# Patient Record
Sex: FEMALE | Race: WHITE | NOT HISPANIC OR LATINO | Employment: OTHER | URBAN - METROPOLITAN AREA
[De-identification: names, ages, dates, MRNs, and addresses within clinical notes are randomized per-mention and may not be internally consistent; named-entity substitution may affect disease eponyms.]

---

## 2017-07-10 ENCOUNTER — HISTORICAL (OUTPATIENT)
Dept: LAB | Facility: HOSPITAL | Age: 67
End: 2017-07-10

## 2018-03-22 ENCOUNTER — HISTORICAL (OUTPATIENT)
Dept: RADIOLOGY | Facility: HOSPITAL | Age: 68
End: 2018-03-22

## 2019-01-17 ENCOUNTER — HISTORICAL (OUTPATIENT)
Dept: LAB | Facility: HOSPITAL | Age: 69
End: 2019-01-17

## 2019-01-17 LAB
ALBUMIN SERPL-MCNC: 4.3 GM/DL (ref 3.4–5)
ALP SERPL-CCNC: 53 UNIT/L (ref 46–116)
ALT SERPL-CCNC: 32 UNIT/L (ref 12–78)
AST SERPL-CCNC: 19 UNIT/L (ref 15–37)
BILIRUB SERPL-MCNC: 0.6 MG/DL (ref 0.2–1)
BILIRUBIN DIRECT+TOT PNL SERPL-MCNC: 0.14 MG/DL (ref 0–0.2)
BILIRUBIN DIRECT+TOT PNL SERPL-MCNC: 0.46 MG/DL (ref 0–0.8)
BUN SERPL-MCNC: 11.9 MG/DL (ref 7–18)
CALCIUM SERPL-MCNC: 10.8 MG/DL (ref 8.5–10.1)
CHLORIDE SERPL-SCNC: 97 MMOL/L (ref 98–107)
CHOLEST SERPL-MCNC: 281 MG/DL (ref 0–200)
CHOLEST/HDLC SERPL: 4.3 {RATIO} (ref 0–4)
CO2 SERPL-SCNC: 30.1 MMOL/L (ref 21–32)
CREAT SERPL-MCNC: 0.65 MG/DL (ref 0.6–1.3)
CREAT/UREA NIT SERPL: 18
DEPRECATED CALCIDIOL+CALCIFEROL SERPL-MC: 53.59 NG/ML (ref 30–80)
ERYTHROCYTE [DISTWIDTH] IN BLOOD BY AUTOMATED COUNT: 12 % (ref 11.5–17)
EST. AVERAGE GLUCOSE BLD GHB EST-MCNC: 105 MG/DL
GLUCOSE SERPL-MCNC: 99 MG/DL (ref 74–106)
HBA1C MFR BLD: 5.3 % (ref 4.5–6.2)
HCT VFR BLD AUTO: 46.7 % (ref 37–47)
HDLC SERPL-MCNC: 65 MG/DL (ref 40–60)
HGB BLD-MCNC: 15.7 GM/DL (ref 12–16)
LDLC SERPL CALC-MCNC: 194 MG/DL (ref 0–129)
MCH RBC QN AUTO: 31.3 PG (ref 27–31)
MCHC RBC AUTO-ENTMCNC: 33.6 GM/DL (ref 33–36)
MCV RBC AUTO: 93 FL (ref 80–94)
PLATELET # BLD AUTO: 337 X10(3)/MCL (ref 130–400)
PMV BLD AUTO: 10.1 FL (ref 9.4–12.4)
POTASSIUM SERPL-SCNC: 4.3 MMOL/L (ref 3.5–5.1)
PROT SERPL-MCNC: 8.1 GM/DL (ref 6.4–8.2)
RBC # BLD AUTO: 5.02 X10(6)/MCL (ref 4.2–5.4)
SODIUM SERPL-SCNC: 137 MMOL/L (ref 136–145)
T3RU NFR SERPL: 32 % (ref 31–39)
T4 SERPL-MCNC: 7.9 MCG/DL (ref 4.7–13.3)
TRIGL SERPL-MCNC: 108 MG/DL
TSH SERPL-ACNC: 0.97 MIU/ML (ref 0.36–3.74)
VLDLC SERPL CALC-MCNC: 22 MG/DL
WBC # SPEC AUTO: 7 X10(3)/MCL (ref 4.5–11.5)

## 2022-07-13 ENCOUNTER — HOSPITAL ENCOUNTER (EMERGENCY)
Facility: HOSPITAL | Age: 72
Discharge: HOME | End: 2022-07-13
Attending: FAMILY MEDICINE
Payer: MEDICARE

## 2022-07-13 VITALS
DIASTOLIC BLOOD PRESSURE: 81 MMHG | HEIGHT: 63 IN | OXYGEN SATURATION: 99 % | TEMPERATURE: 98 F | RESPIRATION RATE: 20 BRPM | BODY MASS INDEX: 28.35 KG/M2 | WEIGHT: 160 LBS | SYSTOLIC BLOOD PRESSURE: 145 MMHG | HEART RATE: 80 BPM

## 2022-07-13 DIAGNOSIS — M79.674 TOE PAIN, RIGHT: Primary | ICD-10-CM

## 2022-07-13 DIAGNOSIS — S91.209A AVULSION OF TOENAIL, INITIAL ENCOUNTER: ICD-10-CM

## 2022-07-13 PROCEDURE — 99284 EMERGENCY DEPT VISIT MOD MDM: CPT

## 2022-07-13 RX ORDER — MUPIROCIN 20 MG/G
OINTMENT TOPICAL 3 TIMES DAILY
Qty: 30 G | Refills: 0 | Status: SHIPPED | OUTPATIENT
Start: 2022-07-13

## 2022-07-13 RX ORDER — KETOROLAC TROMETHAMINE 10 MG/1
10 TABLET, FILM COATED ORAL EVERY 6 HOURS
Qty: 15 TABLET | Refills: 0 | Status: SHIPPED | OUTPATIENT
Start: 2022-07-13 | End: 2022-07-18

## 2022-07-13 NOTE — ED NOTES
Rt great toe cleaned w ns then dressing applied.  Pt had already cleaned w h202 last night after injury.

## 2022-07-13 NOTE — ED PROVIDER NOTES
Encounter Date: 2022       History     Chief Complaint   Patient presents with    Toe Injury     Slipped on steps this am and injured R great toe -states part of the nail came off      71-year-old female fell this morning and hit her right great toe and the nail came off.  He is here just for evaluation.  No bleeding does have some pain now is completely off no other deformities noted pain is mild        Review of patient's allergies indicates:   Allergen Reactions    Penicillins      Past Medical History:   Diagnosis Date    Hypertension     Rheumatoid arthritis, unspecified      Past Surgical History:   Procedure Laterality Date     SECTION      HYSTERECTOMY       History reviewed. No pertinent family history.  Social History     Tobacco Use    Smoking status: Never Smoker    Smokeless tobacco: Never Used   Substance Use Topics    Alcohol use: Yes     Comment: Lamar 1/2 pt daily    Drug use: Never     Review of Systems   Constitutional: Negative for fever.   HENT: Negative for sore throat.    Respiratory: Negative for shortness of breath.    Cardiovascular: Negative for chest pain.   Gastrointestinal: Negative for nausea.   Genitourinary: Negative for dysuria.   Musculoskeletal: Positive for joint swelling. Negative for back pain.   Skin: Positive for wound. Negative for rash.   Neurological: Negative for weakness.   Hematological: Does not bruise/bleed easily.   All other systems reviewed and are negative.      Physical Exam     Initial Vitals [22 1044]   BP Pulse Resp Temp SpO2   (!) 160/90 80 20 97.7 °F (36.5 °C) 99 %      MAP       --         Physical Exam    Nursing note and vitals reviewed.  Constitutional: She appears well-developed and well-nourished. She is active.   HENT:   Head: Normocephalic and atraumatic.   Eyes: Conjunctivae, EOM and lids are normal. Pupils are equal, round, and reactive to light.   Neck: Trachea normal and phonation normal. Neck supple. No thyroid mass  present.   Normal range of motion.  Cardiovascular: Normal rate, regular rhythm, normal heart sounds and normal pulses.   Pulmonary/Chest: Breath sounds normal.   Abdominal: Abdomen is soft. Bowel sounds are normal.   Musculoskeletal:         General: Tenderness present.      Cervical back: Normal range of motion and neck supple.      Comments: Red tender toe no nail is hanging off     Neurological: She is alert and oriented to person, place, and time. She has normal strength and normal reflexes.   Skin: Skin is warm, dry and intact.   Psychiatric: She has a normal mood and affect. Her speech is normal and behavior is normal. Judgment and thought content normal. Cognition and memory are normal.         ED Course   Procedures  Labs Reviewed - No data to display       Imaging Results    None          Medications - No data to display                       Clinical Impression:   Final diagnoses:  [M79.674] Toe pain, right (Primary)  [S91.209A] Avulsion of toenail, initial encounter          ED Disposition Condition    Discharge Stable        ED Prescriptions     Medication Sig Dispense Start Date End Date Auth. Provider    ketorolac (TORADOL) 10 mg tablet Take 1 tablet (10 mg total) by mouth every 6 (six) hours. for 5 days 15 tablet 7/13/2022 7/18/2022 Ady Estrella MD    mupirocin (BACTROBAN) 2 % ointment Apply topically 3 (three) times daily. 30 g 7/13/2022  Ady Estrella MD        Follow-up Information     Follow up With Specialties Details Why Contact Info    Juan Manuel Payne MD Internal Medicine  For wound re-check [address] [facility] [address]  [phone]             Ady Estrella MD  07/13/22 5690

## 2023-01-20 ENCOUNTER — LAB VISIT (OUTPATIENT)
Dept: LAB | Facility: HOSPITAL | Age: 73
End: 2023-01-20
Attending: INTERNAL MEDICINE
Payer: MEDICARE

## 2023-01-20 DIAGNOSIS — R53.83 FATIGUE, UNSPECIFIED TYPE: Primary | ICD-10-CM

## 2023-01-20 DIAGNOSIS — E55.9 AVITAMINOSIS D: ICD-10-CM

## 2023-01-20 LAB
DEPRECATED CALCIDIOL+CALCIFEROL SERPL-MC: 142.8 NG/ML (ref 30–80)
FT4I SERPL CALC-MCNC: 2.95 (ref 2.6–3.6)
T3RU NFR SERPL: 32.69 % (ref 31–39)
T4 SERPL-MCNC: 9.03 UG/DL (ref 4.87–11.72)
TSH SERPL-ACNC: 1.2 UIU/ML (ref 0.35–4.94)

## 2023-01-20 PROCEDURE — 82306 VITAMIN D 25 HYDROXY: CPT

## 2023-01-20 PROCEDURE — 84443 ASSAY THYROID STIM HORMONE: CPT

## 2023-01-20 PROCEDURE — 84436 ASSAY OF TOTAL THYROXINE: CPT

## 2023-01-20 PROCEDURE — 84479 ASSAY OF THYROID (T3 OR T4): CPT

## 2023-01-20 PROCEDURE — 36415 COLL VENOUS BLD VENIPUNCTURE: CPT

## 2023-01-30 ENCOUNTER — LAB VISIT (OUTPATIENT)
Dept: LAB | Facility: HOSPITAL | Age: 73
End: 2023-01-30
Attending: INTERNAL MEDICINE
Payer: MEDICARE

## 2023-01-30 DIAGNOSIS — R53.83 FATIGUE, UNSPECIFIED TYPE: Primary | ICD-10-CM

## 2023-01-30 DIAGNOSIS — R17 JAUNDICE: ICD-10-CM

## 2023-01-30 DIAGNOSIS — Z00.00 ROUTINE GENERAL MEDICAL EXAMINATION AT A HEALTH CARE FACILITY: ICD-10-CM

## 2023-01-30 LAB
ALBUMIN SERPL-MCNC: 3.5 G/DL (ref 3.4–4.8)
ALBUMIN/GLOB SERPL: 0.9 RATIO (ref 1.1–2)
ALP SERPL-CCNC: 289 UNIT/L (ref 40–150)
ALT SERPL-CCNC: 794 UNIT/L (ref 0–55)
APPEARANCE UR: ABNORMAL
AST SERPL-CCNC: 241 UNIT/L (ref 5–34)
BACTERIA #/AREA URNS AUTO: NORMAL /HPF
BILIRUB UR QL STRIP.AUTO: ABNORMAL MG/DL
BILIRUBIN DIRECT+TOT PNL SERPL-MCNC: 17.1 MG/DL
BUN SERPL-MCNC: 17.4 MG/DL (ref 9.8–20.1)
CALCIUM SERPL-MCNC: 10.1 MG/DL (ref 8.4–10.2)
CHLORIDE SERPL-SCNC: 102 MMOL/L (ref 98–107)
CO2 SERPL-SCNC: 22 MMOL/L (ref 23–31)
COLOR UR AUTO: ABNORMAL
CREAT SERPL-MCNC: 0.99 MG/DL (ref 0.55–1.02)
ERYTHROCYTE [DISTWIDTH] IN BLOOD BY AUTOMATED COUNT: 14 % (ref 11.5–17)
ERYTHROCYTE [SEDIMENTATION RATE] IN BLOOD: 24 MM/HR (ref 0–20)
GFR SERPLBLD CREATININE-BSD FMLA CKD-EPI: >60 MLS/MIN/1.73/M2
GLOBULIN SER-MCNC: 3.7 GM/DL (ref 2.4–3.5)
GLUCOSE SERPL-MCNC: 104 MG/DL (ref 82–115)
GLUCOSE UR QL STRIP.AUTO: NEGATIVE MG/DL
HCT VFR BLD AUTO: 39.6 % (ref 37–47)
HGB BLD-MCNC: 12.9 GM/DL (ref 12–16)
KETONES UR QL STRIP.AUTO: NEGATIVE MG/DL
LEUKOCYTE ESTERASE UR QL STRIP.AUTO: ABNORMAL UNIT/L
MCH RBC QN AUTO: 31.6 PG
MCHC RBC AUTO-ENTMCNC: 32.6 MG/DL (ref 33–36)
MCV RBC AUTO: 97.1 FL (ref 80–94)
NITRITE UR QL STRIP.AUTO: NEGATIVE
PH UR STRIP.AUTO: 5.5 [PH]
PLATELET # BLD AUTO: 381 X10(3)/MCL (ref 130–400)
PMV BLD AUTO: 11.6 FL (ref 7.4–10.4)
POTASSIUM SERPL-SCNC: 4.3 MMOL/L (ref 3.5–5.1)
PROT SERPL-MCNC: 7.2 GM/DL (ref 5.8–7.6)
PROT UR QL STRIP.AUTO: NEGATIVE MG/DL
RBC # BLD AUTO: 4.08 X10(6)/MCL (ref 4.2–5.4)
RBC #/AREA URNS AUTO: NORMAL /HPF
RBC UR QL AUTO: NEGATIVE UNIT/L
SODIUM SERPL-SCNC: 136 MMOL/L (ref 136–145)
SP GR UR STRIP.AUTO: 1.02
SQUAMOUS #/AREA URNS AUTO: NORMAL /HPF
TSH SERPL-ACNC: 0.95 UIU/ML (ref 0.35–4.94)
UROBILINOGEN UR STRIP-ACNC: 0.2 MG/DL
WBC # SPEC AUTO: 10 X10(3)/MCL (ref 4.5–11.5)
WBC #/AREA URNS AUTO: NORMAL /HPF

## 2023-01-30 PROCEDURE — 85651 RBC SED RATE NONAUTOMATED: CPT

## 2023-01-30 PROCEDURE — 80074 ACUTE HEPATITIS PANEL: CPT

## 2023-01-30 PROCEDURE — 80053 COMPREHEN METABOLIC PANEL: CPT

## 2023-01-30 PROCEDURE — 85027 COMPLETE CBC AUTOMATED: CPT

## 2023-01-30 PROCEDURE — 81001 URINALYSIS AUTO W/SCOPE: CPT

## 2023-01-30 PROCEDURE — 36415 COLL VENOUS BLD VENIPUNCTURE: CPT

## 2023-01-30 PROCEDURE — 84443 ASSAY THYROID STIM HORMONE: CPT

## 2023-01-31 LAB
HAV IGM SERPL QL IA: NONREACTIVE
HBV CORE IGM SERPL QL IA: NONREACTIVE
HBV SURFACE AG SERPL QL IA: NONREACTIVE
HCV AB SERPL QL IA: NONREACTIVE

## 2023-02-02 DIAGNOSIS — R10.9 AP (ABDOMINAL PAIN): Primary | ICD-10-CM

## 2023-02-02 LAB — PATH REV: NORMAL

## 2023-02-03 ENCOUNTER — HOSPITAL ENCOUNTER (OUTPATIENT)
Dept: RADIOLOGY | Facility: HOSPITAL | Age: 73
Discharge: HOME | End: 2023-02-03
Attending: INTERNAL MEDICINE
Payer: MEDICARE

## 2023-02-03 DIAGNOSIS — R10.9 AP (ABDOMINAL PAIN): ICD-10-CM

## 2023-02-03 PROCEDURE — 76705 ECHO EXAM OF ABDOMEN: CPT | Mod: TC

## 2023-02-07 ENCOUNTER — LAB VISIT (OUTPATIENT)
Dept: LAB | Facility: HOSPITAL | Age: 73
End: 2023-02-07
Attending: INTERNAL MEDICINE
Payer: MEDICARE

## 2023-02-07 DIAGNOSIS — Z01.84 IMMUNITY STATUS TESTING: Primary | ICD-10-CM

## 2023-02-07 DIAGNOSIS — R17 RECURRENT JAUNDICE OF PREGNANCY: ICD-10-CM

## 2023-02-07 DIAGNOSIS — O26.619 RECURRENT JAUNDICE OF PREGNANCY: ICD-10-CM

## 2023-02-07 DIAGNOSIS — Z72.89 OTHER PROBLEMS RELATED TO LIFESTYLE: ICD-10-CM

## 2023-02-07 DIAGNOSIS — R74.8 ACID PHOSPHATASE ELEVATED: ICD-10-CM

## 2023-02-07 DIAGNOSIS — K83.8 BILE DUCT PROLIFERATION: ICD-10-CM

## 2023-02-07 DIAGNOSIS — Z11.59 SCREENING EXAMINATION FOR POLIOMYELITIS: ICD-10-CM

## 2023-02-07 DIAGNOSIS — M06.9 RHEUMATOID ARTHRITIS, INVOLVING UNSPECIFIED SITE, UNSPECIFIED WHETHER RHEUMATOID FACTOR PRESENT: ICD-10-CM

## 2023-02-07 DIAGNOSIS — K80.20 BILIARY CALCULUS: ICD-10-CM

## 2023-02-07 DIAGNOSIS — R10.13 ABDOMINAL PAIN, EPIGASTRIC: ICD-10-CM

## 2023-02-07 LAB
ALBUMIN SERPL-MCNC: 3.3 G/DL (ref 3.4–4.8)
ALBUMIN/GLOB SERPL: 1.1 RATIO (ref 1.1–2)
ALP SERPL-CCNC: 255 UNIT/L (ref 40–150)
ALT SERPL-CCNC: 207 UNIT/L (ref 0–55)
AMYLASE SERPL-CCNC: 237 UNIT/L (ref 20–160)
AST SERPL-CCNC: 77 UNIT/L (ref 5–34)
BASOPHILS # BLD AUTO: 0.03 X10(3)/MCL (ref 0–0.2)
BASOPHILS NFR BLD AUTO: 0.3 %
BILIRUBIN DIRECT+TOT PNL SERPL-MCNC: 24.6 MG/DL
BUN SERPL-MCNC: 17 MG/DL (ref 9.8–20.1)
CALCIUM SERPL-MCNC: 9.7 MG/DL (ref 8.4–10.2)
CHLORIDE SERPL-SCNC: 102 MMOL/L (ref 98–107)
CO2 SERPL-SCNC: 19 MMOL/L (ref 23–31)
CREAT SERPL-MCNC: 1.05 MG/DL (ref 0.55–1.02)
EOSINOPHIL # BLD AUTO: 0.01 X10(3)/MCL (ref 0–0.9)
EOSINOPHIL NFR BLD AUTO: 0.1 %
ERYTHROCYTE [DISTWIDTH] IN BLOOD BY AUTOMATED COUNT: 15.4 % (ref 11.5–17)
FERRITIN SERPL-MCNC: 624.59 NG/ML (ref 4.63–204)
GFR SERPLBLD CREATININE-BSD FMLA CKD-EPI: 57 MLS/MIN/1.73/M2
GLOBULIN SER-MCNC: 3 GM/DL (ref 2.4–3.5)
GLUCOSE SERPL-MCNC: 115 MG/DL (ref 82–115)
HCT VFR BLD AUTO: 35.4 % (ref 37–47)
HGB BLD-MCNC: 12.2 GM/DL (ref 12–16)
HIV 1+2 AB+HIV1 P24 AG SERPL QL IA: NONREACTIVE
IGA SERPL-MCNC: 243 MG/DL (ref 69–517)
IGG SERPL-MCNC: 491 MG/DL (ref 522–1631)
IGM SERPL-MCNC: 137 MG/DL (ref 33–293)
IMM GRANULOCYTES # BLD AUTO: 0.08 X10(3)/MCL (ref 0–0.04)
IMM GRANULOCYTES NFR BLD AUTO: 0.7 %
INR BLD: 1.27 (ref 0–1.3)
IRON SATN MFR SERPL: 24 % (ref 20–50)
IRON SERPL-MCNC: 64 UG/DL (ref 50–170)
LIPASE SERPL-CCNC: 287 U/L
LYMPHOCYTES # BLD AUTO: 0.96 X10(3)/MCL (ref 0.6–4.6)
LYMPHOCYTES NFR BLD AUTO: 8.8 %
MCH RBC QN AUTO: 32.5 PG
MCHC RBC AUTO-ENTMCNC: 34.5 MG/DL (ref 33–36)
MCV RBC AUTO: 94.4 FL (ref 80–94)
MONOCYTES # BLD AUTO: 1.15 X10(3)/MCL (ref 0.1–1.3)
MONOCYTES NFR BLD AUTO: 10.5 %
NEUTROPHILS # BLD AUTO: 8.74 X10(3)/MCL (ref 2.1–9.2)
NEUTROPHILS NFR BLD AUTO: 79.6 %
NRBC BLD AUTO-RTO: 0 %
PLATELET # BLD AUTO: 405 X10(3)/MCL (ref 130–400)
PMV BLD AUTO: 11.8 FL (ref 7.4–10.4)
POTASSIUM SERPL-SCNC: 3.9 MMOL/L (ref 3.5–5.1)
PROT SERPL-MCNC: 6.3 GM/DL (ref 5.8–7.6)
PROTHROMBIN TIME: 15.7 SECONDS (ref 12.5–14.5)
RBC # BLD AUTO: 3.75 X10(6)/MCL (ref 4.2–5.4)
SODIUM SERPL-SCNC: 134 MMOL/L (ref 136–145)
TIBC SERPL-MCNC: 204 UG/DL (ref 70–310)
TIBC SERPL-MCNC: 268 UG/DL (ref 250–450)
TRANSFERRIN SERPL-MCNC: 242 MG/DL (ref 173–360)
WBC # SPEC AUTO: 11 X10(3)/MCL (ref 4.5–11.5)

## 2023-02-07 PROCEDURE — 85610 PROTHROMBIN TIME: CPT

## 2023-02-07 PROCEDURE — 82784 ASSAY IGA/IGD/IGG/IGM EACH: CPT

## 2023-02-07 PROCEDURE — 86704 HEP B CORE ANTIBODY TOTAL: CPT

## 2023-02-07 PROCEDURE — 85025 COMPLETE CBC W/AUTO DIFF WBC: CPT

## 2023-02-07 PROCEDURE — 83690 ASSAY OF LIPASE: CPT

## 2023-02-07 PROCEDURE — 82104 ALPHA-1-ANTITRYPSIN PHENO: CPT

## 2023-02-07 PROCEDURE — 82728 ASSAY OF FERRITIN: CPT

## 2023-02-07 PROCEDURE — 80053 COMPREHEN METABOLIC PANEL: CPT

## 2023-02-07 PROCEDURE — 86709 HEPATITIS A IGM ANTIBODY: CPT

## 2023-02-07 PROCEDURE — 82103 ALPHA-1-ANTITRYPSIN TOTAL: CPT

## 2023-02-07 PROCEDURE — 82150 ASSAY OF AMYLASE: CPT

## 2023-02-07 PROCEDURE — 86803 HEPATITIS C AB TEST: CPT

## 2023-02-07 PROCEDURE — 87517 HEPATITIS B DNA QUANT: CPT

## 2023-02-07 PROCEDURE — 86039 ANTINUCLEAR ANTIBODIES (ANA): CPT

## 2023-02-07 PROCEDURE — 86706 HEP B SURFACE ANTIBODY: CPT

## 2023-02-07 PROCEDURE — 83550 IRON BINDING TEST: CPT

## 2023-02-07 PROCEDURE — 36415 COLL VENOUS BLD VENIPUNCTURE: CPT

## 2023-02-07 PROCEDURE — 86381 MITOCHONDRIAL ANTIBODY EACH: CPT

## 2023-02-07 PROCEDURE — 86015 ACTIN ANTIBODY EACH: CPT

## 2023-02-07 PROCEDURE — 87389 HIV-1 AG W/HIV-1&-2 AB AG IA: CPT

## 2023-02-07 PROCEDURE — 83516 IMMUNOASSAY NONANTIBODY: CPT

## 2023-02-07 PROCEDURE — 82784 ASSAY IGA/IGD/IGG/IGM EACH: CPT | Mod: 59

## 2023-02-07 PROCEDURE — 82390 ASSAY OF CERULOPLASMIN: CPT

## 2023-02-08 LAB
ANA SER QL HEP2 SUBST: NORMAL
ANA SER QL HEP2 SUBST: NORMAL
CERULOPLASMIN SERPL-MCNC: 41.3 MG/DL (ref 20–51)
ELIA CELIKEY IGA (TTG IGA): NEGATIVE
HAV IGM SERPL QL IA: NONREACTIVE
HBV CORE AB SERPL QL IA: NONREACTIVE
HBV DNA SERPL NAA+PROBE-ACNC: NORMAL IU/ML
HBV SURFACE AB SER-ACNC: 0 MIU/ML
HBV SURFACE AB SERPL IA-ACNC: NONREACTIVE M[IU]/ML
HCV AB SERPL QL IA: NONREACTIVE
MITOCHONDRIA M2 AB SER IA-ACNC: <0.1 U

## 2023-02-09 LAB — SMOOTH MUSCLE AB SER QL IF: NEGATIVE

## 2023-02-10 LAB
A1AT PHENOTYP SERPL-IMP: ABNORMAL BANDS
A1AT SERPL NEPH-MCNC: 214 MG/DL (ref 100–190)

## 2023-02-15 RX ORDER — COLESTIPOL HYDROCHLORIDE 5 G/5G
5 GRANULE, FOR SUSPENSION ORAL 2 TIMES DAILY
COMMUNITY

## 2023-02-15 RX ORDER — FOLIC ACID 1 MG/1
1 TABLET ORAL DAILY
COMMUNITY

## 2023-02-15 RX ORDER — FUROSEMIDE 40 MG/1
40 TABLET ORAL 2 TIMES DAILY
COMMUNITY

## 2023-02-15 RX ORDER — ALPRAZOLAM 0.5 MG/1
0.5 TABLET ORAL 3 TIMES DAILY
COMMUNITY

## 2023-02-15 RX ORDER — DILTIAZEM HYDROCHLORIDE 120 MG/1
120 CAPSULE, EXTENDED RELEASE ORAL DAILY
COMMUNITY

## 2023-02-15 RX ORDER — PANTOPRAZOLE SODIUM 40 MG/1
40 TABLET, DELAYED RELEASE ORAL DAILY
COMMUNITY

## 2023-02-15 RX ORDER — MELOXICAM 7.5 MG/1
7.5 TABLET ORAL DAILY
COMMUNITY

## 2023-02-15 RX ORDER — SPIRONOLACTONE 100 MG/1
100 TABLET, FILM COATED ORAL DAILY
COMMUNITY

## 2023-02-16 ENCOUNTER — ANESTHESIA EVENT (OUTPATIENT)
Dept: ENDOSCOPY | Facility: HOSPITAL | Age: 73
End: 2023-02-16
Payer: MEDICARE

## 2023-02-16 ENCOUNTER — HOSPITAL ENCOUNTER (OUTPATIENT)
Facility: HOSPITAL | Age: 73
Discharge: HOME | End: 2023-02-16
Attending: INTERNAL MEDICINE | Admitting: INTERNAL MEDICINE
Payer: MEDICARE

## 2023-02-16 ENCOUNTER — ANESTHESIA (OUTPATIENT)
Dept: ENDOSCOPY | Facility: HOSPITAL | Age: 73
End: 2023-02-16
Payer: MEDICARE

## 2023-02-16 VITALS
OXYGEN SATURATION: 100 % | HEIGHT: 63 IN | SYSTOLIC BLOOD PRESSURE: 126 MMHG | HEART RATE: 83 BPM | DIASTOLIC BLOOD PRESSURE: 78 MMHG | BODY MASS INDEX: 24.8 KG/M2 | TEMPERATURE: 97 F | RESPIRATION RATE: 16 BRPM | WEIGHT: 140 LBS

## 2023-02-16 DIAGNOSIS — K83.8 COMMON BILE DUCT DILATATION: ICD-10-CM

## 2023-02-16 DIAGNOSIS — R17 JAUNDICE: ICD-10-CM

## 2023-02-16 DIAGNOSIS — R74.8 ELEVATED LIVER ENZYMES: ICD-10-CM

## 2023-02-16 DIAGNOSIS — K80.20 GALLSTONES: ICD-10-CM

## 2023-02-16 PROCEDURE — C2617 STENT, NON-COR, TEM W/O DEL: HCPCS | Performed by: INTERNAL MEDICINE

## 2023-02-16 PROCEDURE — 74328 X-RAY BILE DUCT ENDOSCOPY: CPT | Performed by: INTERNAL MEDICINE

## 2023-02-16 PROCEDURE — C1769 GUIDE WIRE: HCPCS | Performed by: INTERNAL MEDICINE

## 2023-02-16 PROCEDURE — 25000003 PHARM REV CODE 250: Performed by: NURSE ANESTHETIST, CERTIFIED REGISTERED

## 2023-02-16 PROCEDURE — C2625 STENT, NON-COR, TEM W/DEL SY: HCPCS | Performed by: INTERNAL MEDICINE

## 2023-02-16 PROCEDURE — 37000008 HC ANESTHESIA 1ST 15 MINUTES: Performed by: INTERNAL MEDICINE

## 2023-02-16 PROCEDURE — 37000009 HC ANESTHESIA EA ADD 15 MINS: Performed by: INTERNAL MEDICINE

## 2023-02-16 PROCEDURE — 25000003 PHARM REV CODE 250

## 2023-02-16 PROCEDURE — 27201423 OPTIME MED/SURG SUP & DEVICES STERILE SUPPLY: Performed by: INTERNAL MEDICINE

## 2023-02-16 PROCEDURE — 63600175 PHARM REV CODE 636 W HCPCS: Performed by: NURSE ANESTHETIST, CERTIFIED REGISTERED

## 2023-02-16 PROCEDURE — 43274 ERCP DUCT STENT PLACEMENT: CPT | Mod: 59 | Performed by: INTERNAL MEDICINE

## 2023-02-16 PROCEDURE — 63600175 PHARM REV CODE 636 W HCPCS

## 2023-02-16 PROCEDURE — 63600175 PHARM REV CODE 636 W HCPCS: Performed by: ANESTHESIOLOGY

## 2023-02-16 DEVICE — STENT ADVANIX CNTR BEND 10X7: Type: IMPLANTABLE DEVICE | Site: BILE DUCT | Status: FUNCTIONAL

## 2023-02-16 DEVICE — IMPLANTABLE DEVICE: Type: IMPLANTABLE DEVICE | Site: BILE DUCT | Status: FUNCTIONAL

## 2023-02-16 RX ORDER — INDOMETHACIN 50 MG/1
100 SUPPOSITORY RECTAL ONCE
Status: COMPLETED | OUTPATIENT
Start: 2023-02-16 | End: 2023-02-16

## 2023-02-16 RX ORDER — ONDANSETRON 2 MG/ML
4 INJECTION INTRAMUSCULAR; INTRAVENOUS ONCE
Status: DISCONTINUED | OUTPATIENT
Start: 2023-02-16 | End: 2023-02-16 | Stop reason: HOSPADM

## 2023-02-16 RX ORDER — ONDANSETRON 2 MG/ML
INJECTION INTRAMUSCULAR; INTRAVENOUS
Status: DISCONTINUED | OUTPATIENT
Start: 2023-02-16 | End: 2023-02-16

## 2023-02-16 RX ORDER — LIDOCAINE HYDROCHLORIDE 10 MG/ML
1 INJECTION, SOLUTION EPIDURAL; INFILTRATION; INTRACAUDAL; PERINEURAL ONCE
Status: DISCONTINUED | OUTPATIENT
Start: 2023-02-16 | End: 2023-02-16 | Stop reason: HOSPADM

## 2023-02-16 RX ORDER — DEXAMETHASONE SODIUM PHOSPHATE 4 MG/ML
INJECTION, SOLUTION INTRA-ARTICULAR; INTRALESIONAL; INTRAMUSCULAR; INTRAVENOUS; SOFT TISSUE
Status: DISCONTINUED | OUTPATIENT
Start: 2023-02-16 | End: 2023-02-16

## 2023-02-16 RX ORDER — LIDOCAINE HCL/PF 100 MG/5ML
SYRINGE (ML) INTRAVENOUS
Status: DISCONTINUED | OUTPATIENT
Start: 2023-02-16 | End: 2023-02-16

## 2023-02-16 RX ORDER — ROCURONIUM BROMIDE 10 MG/ML
INJECTION, SOLUTION INTRAVENOUS
Status: DISCONTINUED | OUTPATIENT
Start: 2023-02-16 | End: 2023-02-16

## 2023-02-16 RX ORDER — PROPOFOL 10 MG/ML
INJECTION, EMULSION INTRAVENOUS
Status: DISCONTINUED | OUTPATIENT
Start: 2023-02-16 | End: 2023-02-16

## 2023-02-16 RX ORDER — GLYCOPYRROLATE 0.2 MG/ML
INJECTION INTRAMUSCULAR; INTRAVENOUS
Status: DISCONTINUED | OUTPATIENT
Start: 2023-02-16 | End: 2023-02-16

## 2023-02-16 RX ORDER — INDOMETHACIN 50 MG/1
SUPPOSITORY RECTAL
Status: COMPLETED
Start: 2023-02-16 | End: 2023-02-16

## 2023-02-16 RX ORDER — HYDROMORPHONE HYDROCHLORIDE 2 MG/ML
0.2 INJECTION, SOLUTION INTRAMUSCULAR; INTRAVENOUS; SUBCUTANEOUS EVERY 5 MIN PRN
Status: CANCELLED | OUTPATIENT
Start: 2023-02-16

## 2023-02-16 RX ORDER — ONDANSETRON 2 MG/ML
4 INJECTION INTRAMUSCULAR; INTRAVENOUS ONCE AS NEEDED
Status: CANCELLED | OUTPATIENT
Start: 2023-02-16 | End: 2034-07-15

## 2023-02-16 RX ORDER — FENTANYL CITRATE 50 UG/ML
INJECTION, SOLUTION INTRAMUSCULAR; INTRAVENOUS
Status: DISCONTINUED | OUTPATIENT
Start: 2023-02-16 | End: 2023-02-16

## 2023-02-16 RX ORDER — LEVOFLOXACIN 5 MG/ML
INJECTION, SOLUTION INTRAVENOUS
Status: DISCONTINUED | OUTPATIENT
Start: 2023-02-16 | End: 2023-02-16

## 2023-02-16 RX ORDER — ONDANSETRON 2 MG/ML
INJECTION INTRAMUSCULAR; INTRAVENOUS
Status: COMPLETED
Start: 2023-02-16 | End: 2023-02-16

## 2023-02-16 RX ORDER — SODIUM CHLORIDE, SODIUM LACTATE, POTASSIUM CHLORIDE, CALCIUM CHLORIDE 600; 310; 30; 20 MG/100ML; MG/100ML; MG/100ML; MG/100ML
INJECTION, SOLUTION INTRAVENOUS CONTINUOUS
Status: DISCONTINUED | OUTPATIENT
Start: 2023-02-16 | End: 2023-02-16 | Stop reason: HOSPADM

## 2023-02-16 RX ORDER — CEFTRIAXONE 1 G/1
INJECTION, POWDER, FOR SOLUTION INTRAMUSCULAR; INTRAVENOUS
Status: DISCONTINUED
Start: 2023-02-16 | End: 2023-02-16 | Stop reason: WASHOUT

## 2023-02-16 RX ORDER — LEVOFLOXACIN 5 MG/ML
INJECTION, SOLUTION INTRAVENOUS
Status: COMPLETED
Start: 2023-02-16 | End: 2023-02-16

## 2023-02-16 RX ADMIN — FENTANYL CITRATE 25 MCG: 50 INJECTION, SOLUTION INTRAMUSCULAR; INTRAVENOUS at 03:02

## 2023-02-16 RX ADMIN — SUGAMMADEX 200 MG: 100 INJECTION, SOLUTION INTRAVENOUS at 04:02

## 2023-02-16 RX ADMIN — ROCURONIUM BROMIDE 50 MG: 10 SOLUTION INTRAVENOUS at 03:02

## 2023-02-16 RX ADMIN — LIDOCAINE HYDROCHLORIDE 80 MG: 20 INJECTION, SOLUTION INTRAVENOUS at 03:02

## 2023-02-16 RX ADMIN — SODIUM CHLORIDE, SODIUM GLUCONATE, SODIUM ACETATE, POTASSIUM CHLORIDE AND MAGNESIUM CHLORIDE: 526; 502; 368; 37; 30 INJECTION, SOLUTION INTRAVENOUS at 03:02

## 2023-02-16 RX ADMIN — GLYCOPYRROLATE 0.2 MG: 0.2 INJECTION INTRAMUSCULAR; INTRAVENOUS at 03:02

## 2023-02-16 RX ADMIN — PROPOFOL 100 MG: 10 INJECTION, EMULSION INTRAVENOUS at 03:02

## 2023-02-16 RX ADMIN — LEVOFLOXACIN 500 MG: 500 INJECTION, SOLUTION INTRAVENOUS at 03:02

## 2023-02-16 RX ADMIN — ONDANSETRON: 2 INJECTION INTRAMUSCULAR; INTRAVENOUS at 05:02

## 2023-02-16 RX ADMIN — DEXAMETHASONE SODIUM PHOSPHATE 4 MG: 4 INJECTION, SOLUTION INTRA-ARTICULAR; INTRALESIONAL; INTRAMUSCULAR; INTRAVENOUS; SOFT TISSUE at 04:02

## 2023-02-16 RX ADMIN — ONDANSETRON 4 MG: 2 INJECTION INTRAMUSCULAR; INTRAVENOUS at 04:02

## 2023-02-16 RX ADMIN — INDOMETHACIN 100 MG: 50 SUPPOSITORY RECTAL at 04:02

## 2023-02-16 RX ADMIN — FENTANYL CITRATE 50 MCG: 50 INJECTION, SOLUTION INTRAMUSCULAR; INTRAVENOUS at 03:02

## 2023-02-16 RX ADMIN — FENTANYL CITRATE 25 MCG: 50 INJECTION, SOLUTION INTRAMUSCULAR; INTRAVENOUS at 04:02

## 2023-02-16 RX ADMIN — SODIUM CHLORIDE, POTASSIUM CHLORIDE, SODIUM LACTATE AND CALCIUM CHLORIDE: 600; 310; 30; 20 INJECTION, SOLUTION INTRAVENOUS at 01:02

## 2023-02-16 NOTE — ANESTHESIA PROCEDURE NOTES
Intubation    Date/Time: 2/16/2023 3:56 PM  Performed by: Selvin Cole CRNA  Authorized by: Selvin Cole CRNA     Intubation:     Induction:  Intravenous    Intubated:  Postinduction    Mask Ventilation:  Easy mask    Attempts:  1    Attempted By:  CRNA    Method of Intubation:  Direct    Blade:  Jimmy 4    Laryngeal View Grade: Grade IIb - only the arytenoids and epiglottis seen      Difficult Airway Encountered?: No      Complications:  None    Airway Device:  Oral endotracheal tube    Airway Device Size:  7.0    Style/Cuff Inflation:  Cuffed    Inflation Amount (mL):  5    Tube secured:  19    Secured at:  The lips    Placement Verified By:  Capnometry and Colorimetric ETCO2 device    Complicating Factors:  None    Findings Post-Intubation:  BS equal bilateral and atraumatic/condition of teeth unchanged

## 2023-02-16 NOTE — H&P
Endoscopy History and Physical    PCP - Juan Manuel Payne MD    Procedure - ERCP  Sedation: MAC  ASA - per anesthesia  Mallampati - per anesthesia  History of Anesthesia problems - no  Family history Anesthesia problems -  no     HPI:  This is a 72 y.o. female here for evaluation of jaundice, ,mild epigastric pain, 8 lb wt loss. 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    Surgical History:  has a past surgical history that includes Hysterectomy and  section.    Family History: family history is not on file.. Otherwise no colon cancer, inflammatory bowel disease, or GI malignancies.    Social History:  reports that she has never smoked. She has never used smokeless tobacco. She reports that she does not currently use alcohol. She reports that she does not use drugs.    Review of patient's allergies indicates:   Allergen Reactions    Penicillins        Medications:   Medications Prior to Admission   Medication Sig Dispense Refill Last Dose    ALPRAZolam (XANAX) 0.5 MG tablet Take 0.5 mg by mouth 3 (three) times daily.   2/15/2023    colestipoL (COLESTID) 5 gram granules Take 5 g by mouth 2 (two) times daily.   2/15/2023    diltiaZEM (DILACOR XR) 120 MG CDCR Take 120 mg by mouth once daily.   2/15/2023    folic acid (FOLVITE) 1 MG tablet Take 1 mg by mouth once daily.   2/15/2023    furosemide (LASIX) 40 MG tablet Take 40 mg by mouth 2 (two) times daily.   2/15/2023    meloxicam (MOBIC) 7.5 MG tablet Take 7.5 mg by mouth once daily.   Past Week    mupirocin (BACTROBAN) 2 % ointment Apply topically 3 (three) times daily. 30 g 0 2/15/2023    pantoprazole  (PROTONIX) 40 MG tablet Take 40 mg by mouth once daily.   Past Week    spironolactone (ALDACTONE) 100 MG tablet Take 100 mg by mouth once daily.   Past Week       Objective Findings:    Vital Signs: Per nursing notes.    Physical Exam:  General Appearance: Well appearing in no acute distress  Head:   Normocephalic, without obvious abnormality  Eyes:    No scleral icterus  Airway: Open  Neck: No restriction in mobility  Lungs: CTA bilaterally in anterior and posterior fields, no wheezes, no crackles.  Heart:  Regular rate and rhythm, S1, S2 normal, no murmurs heard  Abdomen: Soft, non tender, non distended      Labs:  Lab Results   Component Value Date    WBC 11.0 02/07/2023    HGB 12.2 02/07/2023    HCT 35.4 (L) 02/07/2023     (H) 02/07/2023    CHOL 281 (H) 01/17/2019    TRIG 108 01/17/2019    HDL 65 (H) 01/17/2019     (H) 02/07/2023    AST 77 (H) 02/07/2023     (L) 02/07/2023    K 3.9 02/07/2023    CREATININE 1.05 (H) 02/07/2023    BUN 17.0 02/07/2023    CO2 19 (L) 02/07/2023    TSH 0.953 01/30/2023    INR 1.27 02/07/2023    HGBA1C 5.3 01/17/2019         I have explained the risks and benefits of endoscopy procedures to the patient including but not limited to bleeding, perforation, infection, and death.    Thank you so much for allowing me to participate in the care of Abby Prabhakar    Barbra Schrader Iii, MD

## 2023-02-16 NOTE — ANESTHESIA RELEASE NOTE
"Anesthesia Release from PACU Note    Patient: Abby Prabhakar    Procedure(s) Performed: Procedure(s) (LRB):  ERCP (N/A)  ERCP, WITH STENT INSERTION  ERCP, WITH SPHINCTEROTOMY    Anesthesia type: general    Post pain: Adequate analgesia    Post assessment: no apparent anesthetic complications, tolerated procedure well and no evidence of recall    Last Vitals:   Visit Vitals  /67   Pulse 85   Temp 36 °C (96.8 °F) (Tympanic)   Resp (!) 21   Ht 5' 3" (1.6 m)   Wt 63.5 kg (140 lb)   SpO2 100%   BMI 24.80 kg/m²       Post vital signs: stable    Level of consciousness: awake, alert  and responds to stimulation    Nausea/Vomiting: no nausea/no vomiting    Complications: none    Airway Patency: patent    Respiratory: unassisted, spontaneous ventilation, room air    Cardiovascular: stable and blood pressure at baseline    Hydration: euvolemic  "

## 2023-02-16 NOTE — TRANSFER OF CARE
"Anesthesia Transfer of Care Note    Patient: Abby Prabhakar    Procedure(s) Performed: Procedure(s) (LRB):  ERCP (N/A)  ERCP, WITH STENT INSERTION  ERCP, WITH SPHINCTEROTOMY    Patient location: GI    Anesthesia Type: general    Transport from OR: Transported from OR on room air with adequate spontaneous ventilation    Post pain: adequate analgesia    Post assessment: no apparent anesthetic complications and tolerated procedure well    Post vital signs: stable    Level of consciousness: awake and alert    Nausea/Vomiting: no nausea/vomiting    Complications: none    Transfer of care protocol was followed      Last vitals:   Visit Vitals  /67   Pulse 85   Temp 36 °C (96.8 °F) (Tympanic)   Resp (!) 21   Ht 5' 3" (1.6 m)   Wt 63.5 kg (140 lb)   SpO2 100%   BMI 24.80 kg/m²     "

## 2023-02-16 NOTE — DISCHARGE SUMMARY
[facility] Endoscopy  Discharge Note  Short Stay    Procedure(s) (LRB):  ERCP (N/A)  ERCP, WITH STENT INSERTION  ERCP, WITH SPHINCTEROTOMY      OUTCOME: Patient tolerated treatment/procedure well without complication and is now ready for discharge.    DISPOSITION: Home or Self Care    FINAL DIAGNOSIS:  <principal problem not specified>    FOLLOWUP: In clinic    DISCHARGE INSTRUCTIONS:  No discharge procedures on file.     TIME SPENT ON DISCHARGE: 15 minutes

## 2023-02-16 NOTE — [employer] PATIENT INSTRUCTIONS
Discharge Summary/Instructions after an Endoscopic Procedure  Patient Name: Abby Prabhakar  Patient MRN: [id number]  Patient YOB: 1950  Thursday, February 16, 2023  Barbra Schrader III, MD  Dear patient,  As a result of recent federal legislation (The Federal Cures Act), you may   receive lab or pathology results from your procedure in your [facility]   account before your physician is able to contact you. Your physician or   their representative will relay the results to you with their   recommendations at their soonest availability.  Thank you,  RESTRICTIONS:  During your procedure today, you received medications for sedation.  These   medications may affect your judgment, balance and coordination.  Therefore,   for 24 hours, you have the following restrictions:   - DO NOT drive a car, operate machinery, make legal/financial decisions,   sign important papers or drink alcohol.    ACTIVITY:  Today: no heavy lifting, straining or running due to procedural   sedation/anesthesia.  The following day: return to full activity including work.  DIET:  Eat and drink normally unless instructed otherwise.     TREATMENT FOR COMMON SIDE EFFECTS:  - Mild abdominal pain, nausea, belching, bloating or excessive gas:  rest,   eat lightly and use a heating pad.  - Sore Throat: treat with throat lozenges and/or gargle with warm salt   water.  - Because air was used during the procedure, expelling large amounts of air   from your rectum or belching is normal.  - If a bowel prep was taken, you may not have a bowel movement for 1-3 days.    This is normal.  SYMPTOMS TO WATCH FOR AND REPORT TO YOUR PHYSICIAN:  1. Abdominal pain or bloating, other than gas cramps.  2. Chest pain.  3. Back pain.  4. Signs of infection such as: chills or fever occurring within 24 hours   after the procedure.  5. Rectal bleeding, which would show as bright red, maroon, or black stools.   (A tablespoon of blood from the rectum is not serious,  especially if   hemorrhoids are present.)  6. Vomiting.  7. Weakness or dizziness.  GO DIRECTLY TO THE NEAREST EMERGENCY ROOM IF YOU HAVE ANY OF THE FOLLOWING:      Difficulty breathing              Chills and/or fever over 101 F   Persistent vomiting and/or vomiting blood   Severe abdominal pain   Severe chest pain   Black, tarry stools   Bleeding- more than one tablespoon   Any other symptom or condition that you feel may need urgent attention  Your doctor recommends these additional instructions:  If any biopsies were taken, your doctors clinic will contact you in 1 to 2   weeks with any results.  - Await cytology results.  For questions, problems or results please call your physician - Barbra Schrader III, MD at Work:  [phone].  [facility], EMERGENCY ROOM PHONE NUMBER: [phone]  IF A COMPLICATION OR EMERGENCY SITUATION ARISES AND YOU ARE UNABLE TO REACH   YOUR PHYSICIAN - GO DIRECTLY TO THE EMERGENCY ROOM.  Barbra Schrader III, MD  2/16/2023 4:55:36 PM  This report has been verified and signed electronically.  Dear patient,  As a result of recent federal legislation (The Federal Cures Act), you may   receive lab or pathology results from your procedure in your [facility]   account before your physician is able to contact you. Your physician or   their representative will relay the results to you with their   recommendations at their soonest availability.  Thank you,  [employer]OVATION

## 2023-02-16 NOTE — ANESTHESIA POSTPROCEDURE EVALUATION
Anesthesia Post Evaluation    Patient: Abby Prabhakar    Procedure(s) Performed: Procedure(s) (LRB):  ERCP (N/A)  ERCP, WITH STENT INSERTION  ERCP, WITH SPHINCTEROTOMY    Final Anesthesia Type: general      Patient location during evaluation: GI PACU  Patient participation: Yes- Able to Participate  Level of consciousness: awake and oriented  Post-procedure vital signs: reviewed and stable  Pain management: adequate  Airway patency: patent    PONV status at discharge: No PONV  Anesthetic complications: no      Cardiovascular status: stable and hemodynamically stable  Respiratory status: unassisted and spontaneous ventilation  Hydration status: euvolemic  Follow-up not needed.          Vitals Value Taken Time   /78 02/16/23 1722   Temp 36 02/16/23 1725   Pulse 79 02/16/23 1725   Resp 25 02/16/23 1725   SpO2 98 % 02/16/23 1725   Vitals shown include unvalidated device data.      No case tracking events are documented in the log.      Pain/Hanna Score: No data recorded

## 2023-02-20 ENCOUNTER — HOSPITAL ENCOUNTER (INPATIENT)
Facility: HOSPITAL | Age: 73
LOS: 4 days | Discharge: LEFT BEFORE DISCHARGE | DRG: 435 | End: 2023-02-24
Attending: STUDENT IN AN ORGANIZED HEALTH CARE EDUCATION/TRAINING PROGRAM | Admitting: INTERNAL MEDICINE
Payer: MEDICARE

## 2023-02-20 DIAGNOSIS — R10.11 RIGHT UPPER QUADRANT ABDOMINAL PAIN: Primary | ICD-10-CM

## 2023-02-20 DIAGNOSIS — R94.31 EKG ABNORMALITY: ICD-10-CM

## 2023-02-20 DIAGNOSIS — I48.91 ATRIAL FIBRILLATION, UNSPECIFIED TYPE: ICD-10-CM

## 2023-02-20 DIAGNOSIS — K86.89 PANCREATIC MASS: ICD-10-CM

## 2023-02-20 DIAGNOSIS — R17 JAUNDICE: ICD-10-CM

## 2023-02-20 DIAGNOSIS — R17 ELEVATED BILIRUBIN: ICD-10-CM

## 2023-02-20 DIAGNOSIS — R06.02 SOB (SHORTNESS OF BREATH): ICD-10-CM

## 2023-02-20 LAB
ALBUMIN SERPL-MCNC: 2.3 G/DL (ref 3.4–4.8)
ALBUMIN/GLOB SERPL: 0.8 RATIO (ref 1.1–2)
ALP SERPL-CCNC: 267 UNIT/L (ref 40–150)
ALT SERPL-CCNC: 149 UNIT/L (ref 0–55)
APPEARANCE UR: CLEAR
APTT PPP: 23.3 SECONDS (ref 23.2–33.7)
AST SERPL-CCNC: 94 UNIT/L (ref 5–34)
BACTERIA #/AREA URNS AUTO: NORMAL /HPF
BASOPHILS # BLD AUTO: 0.04 X10(3)/MCL (ref 0–0.2)
BASOPHILS NFR BLD AUTO: 0.2 %
BILIRUB UR QL STRIP.AUTO: ABNORMAL MG/DL
BILIRUBIN DIRECT+TOT PNL SERPL-MCNC: 18.4 MG/DL
BILIRUBIN DIRECT+TOT PNL SERPL-MCNC: 9.8 MG/DL (ref 0–?)
BNP BLD-MCNC: 70.1 PG/ML
BUN SERPL-MCNC: 30.8 MG/DL (ref 9.8–20.1)
CALCIUM SERPL-MCNC: 8 MG/DL (ref 8.4–10.2)
CHLORIDE SERPL-SCNC: 98 MMOL/L (ref 98–107)
CO2 SERPL-SCNC: 23 MMOL/L (ref 23–31)
COLOR UR AUTO: ABNORMAL
CREAT SERPL-MCNC: 0.99 MG/DL (ref 0.55–1.02)
EOSINOPHIL # BLD AUTO: 0.01 X10(3)/MCL (ref 0–0.9)
EOSINOPHIL NFR BLD AUTO: 0 %
ERYTHROCYTE [DISTWIDTH] IN BLOOD BY AUTOMATED COUNT: 17.6 % (ref 11.5–17)
GFR SERPLBLD CREATININE-BSD FMLA CKD-EPI: >60 MLS/MIN/1.73/M2
GLOBULIN SER-MCNC: 2.8 GM/DL (ref 2.4–3.5)
GLUCOSE SERPL-MCNC: 87 MG/DL (ref 82–115)
GLUCOSE UR QL STRIP.AUTO: ABNORMAL MG/DL
HCT VFR BLD AUTO: 31.7 % (ref 37–47)
HGB BLD-MCNC: 11.5 G/DL (ref 12–16)
IMM GRANULOCYTES # BLD AUTO: 0.62 X10(3)/MCL (ref 0–0.04)
IMM GRANULOCYTES NFR BLD AUTO: 3 %
INR BLD: 1.07 (ref 0–1.3)
KETONES UR QL STRIP.AUTO: NEGATIVE MG/DL
LEUKOCYTE ESTERASE UR QL STRIP.AUTO: ABNORMAL UNIT/L
LIPASE SERPL-CCNC: 16 U/L
LYMPHOCYTES # BLD AUTO: 1.21 X10(3)/MCL (ref 0.6–4.6)
LYMPHOCYTES NFR BLD AUTO: 5.9 %
MCH RBC QN AUTO: 32.3 PG
MCHC RBC AUTO-ENTMCNC: 36.3 G/DL (ref 33–36)
MCV RBC AUTO: 89 FL (ref 80–94)
MONOCYTES # BLD AUTO: 1.51 X10(3)/MCL (ref 0.1–1.3)
MONOCYTES NFR BLD AUTO: 7.4 %
NEUTROPHILS # BLD AUTO: 17.13 X10(3)/MCL (ref 2.1–9.2)
NEUTROPHILS NFR BLD AUTO: 83.5 %
NITRITE UR QL STRIP.AUTO: NEGATIVE
NRBC BLD AUTO-RTO: 0 %
PH UR STRIP.AUTO: 7 [PH]
PLATELET # BLD AUTO: 530 X10(3)/MCL (ref 130–400)
PMV BLD AUTO: 12.4 FL (ref 7.4–10.4)
POTASSIUM SERPL-SCNC: 3.8 MMOL/L (ref 3.5–5.1)
PROT SERPL-MCNC: 5.1 GM/DL (ref 5.8–7.6)
PROT UR QL STRIP.AUTO: ABNORMAL MG/DL
PROTHROMBIN TIME: 13.8 SECONDS (ref 12.5–14.5)
PSYCHE PATHOLOGY RESULT: NORMAL
RBC # BLD AUTO: 3.56 X10(6)/MCL (ref 4.2–5.4)
RBC #/AREA URNS AUTO: <5 /HPF
RBC UR QL AUTO: NEGATIVE UNIT/L
SODIUM SERPL-SCNC: 131 MMOL/L (ref 136–145)
SP GR UR STRIP.AUTO: >=1.04 (ref 1–1.03)
SQUAMOUS #/AREA URNS AUTO: <5 /HPF
TROPONIN I SERPL-MCNC: <0.01 NG/ML (ref 0–0.04)
UROBILINOGEN UR STRIP-ACNC: 1 MG/DL
WBC # SPEC AUTO: 20.5 X10(3)/MCL (ref 4.5–11.5)
WBC #/AREA URNS AUTO: <5 /HPF

## 2023-02-20 PROCEDURE — 85730 THROMBOPLASTIN TIME PARTIAL: CPT | Performed by: STUDENT IN AN ORGANIZED HEALTH CARE EDUCATION/TRAINING PROGRAM

## 2023-02-20 PROCEDURE — 84484 ASSAY OF TROPONIN QUANT: CPT | Performed by: STUDENT IN AN ORGANIZED HEALTH CARE EDUCATION/TRAINING PROGRAM

## 2023-02-20 PROCEDURE — 25000003 PHARM REV CODE 250: Performed by: STUDENT IN AN ORGANIZED HEALTH CARE EDUCATION/TRAINING PROGRAM

## 2023-02-20 PROCEDURE — 83880 ASSAY OF NATRIURETIC PEPTIDE: CPT | Performed by: STUDENT IN AN ORGANIZED HEALTH CARE EDUCATION/TRAINING PROGRAM

## 2023-02-20 PROCEDURE — 83690 ASSAY OF LIPASE: CPT | Performed by: STUDENT IN AN ORGANIZED HEALTH CARE EDUCATION/TRAINING PROGRAM

## 2023-02-20 PROCEDURE — 21400001 HC TELEMETRY ROOM

## 2023-02-20 PROCEDURE — 80053 COMPREHEN METABOLIC PANEL: CPT | Performed by: STUDENT IN AN ORGANIZED HEALTH CARE EDUCATION/TRAINING PROGRAM

## 2023-02-20 PROCEDURE — 63600175 PHARM REV CODE 636 W HCPCS: Performed by: STUDENT IN AN ORGANIZED HEALTH CARE EDUCATION/TRAINING PROGRAM

## 2023-02-20 PROCEDURE — 87040 BLOOD CULTURE FOR BACTERIA: CPT | Performed by: PHYSICIAN ASSISTANT

## 2023-02-20 PROCEDURE — 99285 EMERGENCY DEPT VISIT HI MDM: CPT | Mod: 25

## 2023-02-20 PROCEDURE — 93010 EKG 12-LEAD: ICD-10-PCS | Mod: ,,, | Performed by: INTERNAL MEDICINE

## 2023-02-20 PROCEDURE — 93010 ELECTROCARDIOGRAM REPORT: CPT | Mod: ,,, | Performed by: INTERNAL MEDICINE

## 2023-02-20 PROCEDURE — 25000003 PHARM REV CODE 250: Performed by: INTERNAL MEDICINE

## 2023-02-20 PROCEDURE — 93005 ELECTROCARDIOGRAM TRACING: CPT

## 2023-02-20 PROCEDURE — 85610 PROTHROMBIN TIME: CPT | Performed by: STUDENT IN AN ORGANIZED HEALTH CARE EDUCATION/TRAINING PROGRAM

## 2023-02-20 PROCEDURE — 82248 BILIRUBIN DIRECT: CPT | Performed by: STUDENT IN AN ORGANIZED HEALTH CARE EDUCATION/TRAINING PROGRAM

## 2023-02-20 PROCEDURE — 25000003 PHARM REV CODE 250: Performed by: PHYSICIAN ASSISTANT

## 2023-02-20 PROCEDURE — 81001 URINALYSIS AUTO W/SCOPE: CPT | Performed by: STUDENT IN AN ORGANIZED HEALTH CARE EDUCATION/TRAINING PROGRAM

## 2023-02-20 PROCEDURE — 11000001 HC ACUTE MED/SURG PRIVATE ROOM

## 2023-02-20 PROCEDURE — 85025 COMPLETE CBC W/AUTO DIFF WBC: CPT | Performed by: STUDENT IN AN ORGANIZED HEALTH CARE EDUCATION/TRAINING PROGRAM

## 2023-02-20 PROCEDURE — 25500020 PHARM REV CODE 255: Performed by: STUDENT IN AN ORGANIZED HEALTH CARE EDUCATION/TRAINING PROGRAM

## 2023-02-20 RX ORDER — HYDROCODONE BITARTRATE AND ACETAMINOPHEN 5; 325 MG/1; MG/1
1 TABLET ORAL EVERY 4 HOURS PRN
Status: DISCONTINUED | OUTPATIENT
Start: 2023-02-20 | End: 2023-02-24 | Stop reason: HOSPADM

## 2023-02-20 RX ORDER — TRAMADOL HYDROCHLORIDE 50 MG/1
50 TABLET ORAL EVERY 6 HOURS PRN
Status: DISCONTINUED | OUTPATIENT
Start: 2023-02-20 | End: 2023-02-24 | Stop reason: HOSPADM

## 2023-02-20 RX ORDER — ACETAMINOPHEN 325 MG/1
650 TABLET ORAL EVERY 8 HOURS PRN
Status: DISCONTINUED | OUTPATIENT
Start: 2023-02-20 | End: 2023-02-24 | Stop reason: HOSPADM

## 2023-02-20 RX ORDER — MORPHINE SULFATE 4 MG/ML
4 INJECTION, SOLUTION INTRAMUSCULAR; INTRAVENOUS
Status: ACTIVE | OUTPATIENT
Start: 2023-02-20 | End: 2023-02-20

## 2023-02-20 RX ORDER — PANTOPRAZOLE SODIUM 40 MG/1
40 TABLET, DELAYED RELEASE ORAL DAILY
Status: DISCONTINUED | OUTPATIENT
Start: 2023-02-21 | End: 2023-02-24 | Stop reason: HOSPADM

## 2023-02-20 RX ORDER — SPIRONOLACTONE 25 MG/1
100 TABLET ORAL DAILY
Status: DISCONTINUED | OUTPATIENT
Start: 2023-02-21 | End: 2023-02-24 | Stop reason: HOSPADM

## 2023-02-20 RX ORDER — ONDANSETRON 2 MG/ML
4 INJECTION INTRAMUSCULAR; INTRAVENOUS
Status: ACTIVE | OUTPATIENT
Start: 2023-02-20 | End: 2023-02-20

## 2023-02-20 RX ORDER — DILTIAZEM HYDROCHLORIDE 120 MG/1
120 CAPSULE, COATED, EXTENDED RELEASE ORAL DAILY
Status: DISCONTINUED | OUTPATIENT
Start: 2023-02-21 | End: 2023-02-24 | Stop reason: HOSPADM

## 2023-02-20 RX ORDER — ONDANSETRON 2 MG/ML
4 INJECTION INTRAMUSCULAR; INTRAVENOUS EVERY 8 HOURS PRN
Status: DISCONTINUED | OUTPATIENT
Start: 2023-02-20 | End: 2023-02-24 | Stop reason: HOSPADM

## 2023-02-20 RX ORDER — IBUPROFEN 200 MG
16 TABLET ORAL
Status: DISCONTINUED | OUTPATIENT
Start: 2023-02-20 | End: 2023-02-24 | Stop reason: HOSPADM

## 2023-02-20 RX ORDER — SODIUM CHLORIDE 9 MG/ML
INJECTION, SOLUTION INTRAVENOUS CONTINUOUS
Status: DISCONTINUED | OUTPATIENT
Start: 2023-02-20 | End: 2023-02-24

## 2023-02-20 RX ORDER — IBUPROFEN 200 MG
24 TABLET ORAL
Status: DISCONTINUED | OUTPATIENT
Start: 2023-02-20 | End: 2023-02-24 | Stop reason: HOSPADM

## 2023-02-20 RX ORDER — FOLIC ACID 1 MG/1
1 TABLET ORAL DAILY
Status: DISCONTINUED | OUTPATIENT
Start: 2023-02-21 | End: 2023-02-24 | Stop reason: HOSPADM

## 2023-02-20 RX ORDER — DIPHENHYDRAMINE HCL 25 MG
25 CAPSULE ORAL EVERY 6 HOURS PRN
Status: DISCONTINUED | OUTPATIENT
Start: 2023-02-20 | End: 2023-02-24 | Stop reason: HOSPADM

## 2023-02-20 RX ORDER — GLUCAGON 1 MG
1 KIT INJECTION
Status: DISCONTINUED | OUTPATIENT
Start: 2023-02-20 | End: 2023-02-24 | Stop reason: HOSPADM

## 2023-02-20 RX ORDER — LIDOCAINE 50 MG/G
1 PATCH TOPICAL
Status: ACTIVE | OUTPATIENT
Start: 2023-02-20 | End: 2023-02-21

## 2023-02-20 RX ORDER — ACETAMINOPHEN 325 MG/1
650 TABLET ORAL EVERY 4 HOURS PRN
Status: DISCONTINUED | OUTPATIENT
Start: 2023-02-20 | End: 2023-02-24 | Stop reason: HOSPADM

## 2023-02-20 RX ADMIN — HYDROCODONE BITARTRATE AND ACETAMINOPHEN 1 TABLET: 5; 325 TABLET ORAL at 11:02

## 2023-02-20 RX ADMIN — SODIUM CHLORIDE: 9 INJECTION, SOLUTION INTRAVENOUS at 03:02

## 2023-02-20 RX ADMIN — DIPHENHYDRAMINE HYDROCHLORIDE 25 MG: 25 CAPSULE ORAL at 04:02

## 2023-02-20 RX ADMIN — SODIUM CHLORIDE, POTASSIUM CHLORIDE, SODIUM LACTATE AND CALCIUM CHLORIDE 1000 ML: 600; 310; 30; 20 INJECTION, SOLUTION INTRAVENOUS at 06:02

## 2023-02-20 RX ADMIN — TRAMADOL HYDROCHLORIDE 50 MG: 50 TABLET, COATED ORAL at 04:02

## 2023-02-20 RX ADMIN — IOPAMIDOL 100 ML: 755 INJECTION, SOLUTION INTRAVENOUS at 09:02

## 2023-02-20 RX ADMIN — CEFEPIME 2 G: 2 INJECTION, POWDER, FOR SOLUTION INTRAVENOUS at 01:02

## 2023-02-20 RX ADMIN — HYDROCODONE BITARTRATE AND ACETAMINOPHEN 1 TABLET: 5; 325 TABLET ORAL at 07:02

## 2023-02-20 NOTE — CONSULTS
"Consult Note    Reason for Consult:      We were consulted by Dr. Ndiaye to evaluate this patient for abnormal imaging post ERCP.    HPI:     73 yo F known to Dr. Jak Gonzalez.  Patient with a PMH of HTN and RA previously on methotrexate and Plaquenil.    Patient c/o epigastric pain, diarrhea, and jaundice.  Work-up was as follows....  Labs 01/30/2023: Hepatitis panel negative, WBCs 10, , , alk-phos 289, T bili 24  RUQ U/S 02/03/2023 dilated gallbladder, cholelithiasis, hepatomegaly, dilated tortuous CBD with no evidence of obstruction, intrahepatic biliary ductal dilation  Labs 02/07/2023: TIFFANIE, ASMA negative, no A1AT deficiency.  MRI/MRCP W/WO 02/14/2023:  Severe intrahepatic and extrahepatic biliary dilation, no choledocholithiasis, ill-defined area of abnormal signal within the pancreatic head near the ampulla measuring about 2.3 cm concerning for primary.  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    ERCP by Dr. Schrader 2/16/23: lower third of the main bile duct was  completely obstructed by what appeared to be a mass.  4 mm biliary sphincterotomy. 10 Fr by 7 cm plastic stent with a single external flap and a single internal flap was placed 6 cm into CBD, Bile flowed through stent. 5 Fr by 5 cm plastic stent with a 3/4 external pigtail and a single internal flap was placed 4 cm into the ventral pancreatic duct. PD with stricture in head of pancreas.  CBD brushings:  Sparse aggregates of atypical epithelial cells consistent with ductal adenocarcinoma.    Patient presented to the ED today with c/o abdominal pain, bloating, and constipation.  Pain is in epigastrium and radiates into RUQ and her back.  Feels like a "brick."  She states the pain worsened after her ERCP.  Though it was secondary to constipation.  Tried several OTC meds without " improvement.      On presentation, patient tachycardic 105 and this improved with IV fluids.  Otherwise afebrile and VSS.  Laboratory notable for leukocytosis 20,500, normocytic anemia with hemoglobin 11.5, T bili 18.4, AST 94, , alk-phos 267, lipase normal 16.  CT abdomen/pelvis with IV contrast noted distended gallbladder, sludge, CBD stent in place, trace pneumobilia, no intrahepatic biliary ductal dilation, pancreas atrophy, questionable subtle hypodense lesion at the region of the ampulla the pancreatic head measuring 1.8 cm, stent in the region of the uncinate process extending beyond the expected contour of the pancreas, mixed density collection in the region of the tip of the stent which extends to encase the SMA in about the celiac measuring 4.5 cm, no pancreatic ductal dilation, moderate stool burden, no bowel obstruction.  GI was consulted.  Patient was admitted.    She was given an enema in the ED and had a bowel movement.    No fam hx of GI neoplasia.  Never smoker.  H/o etoh abuse.  Has always been a .  Drank from 8am-11pm.  Slowed down over the last several months, was drink 1/2 pint per day.  No etoh in the last month.     Previous records reviewed. Collateral information obtained from family member present at bedside.    PCP:  Juan Manuel Payne MD    Review of patient's allergies indicates:   Allergen Reactions    Atorvastatin     Penicillins         Current Facility-Administered Medications   Medication Dose Route Frequency Provider Last Rate Last Admin    0.9%  NaCl infusion   Intravenous Continuous Israel Juan PA-C        acetaminophen tablet 650 mg  650 mg Oral Q8H PRN Israel Juan PA-C        acetaminophen tablet 650 mg  650 mg Oral Q4H PRN Israel Juan PA-C        ceFEPIme (MAXIPIME) 2 g in dextrose 5 % in water (D5W) 5 % 50 mL IVPB (MB+)  2 g Intravenous Q12H Alcides Freeman  mL/hr at 02/20/23 1345 2 g at 02/20/23 1345    dextrose 10% bolus 125 mL 125 mL  12.5 g  Intravenous PRN Israel Juan PA-C        dextrose 10% bolus 250 mL 250 mL  25 g Intravenous PRN Israel Juan PA-C        glucagon (human recombinant) injection 1 mg  1 mg Intramuscular PRN Israel Juan PA-C        glucose chewable tablet 16 g  16 g Oral PRN Israel Juan PA-C        glucose chewable tablet 24 g  24 g Oral PRN Israel Juan PA-C        morphine injection 4 mg  4 mg Intravenous ED 1 Time Alcides Freeman MD        ondansetron injection 4 mg  4 mg Intravenous ED 1 Time Alcides Freeman MD        ondansetron injection 4 mg  4 mg Intravenous Q8H PRN Israel Juan PA-C        sodium phosphates 19-7 gram/118 mL enema 1 enema  1 enema Rectal ED 1 Time Alcides Freeman MD         Current Outpatient Medications   Medication Sig Dispense Refill    ALPRAZolam (XANAX) 0.5 MG tablet Take 0.5 mg by mouth 3 (three) times daily.      colestipoL (COLESTID) 5 gram granules Take 5 g by mouth 2 (two) times daily.      diltiaZEM (DILACOR XR) 120 MG CDCR Take 120 mg by mouth once daily.      folic acid (FOLVITE) 1 MG tablet Take 1 mg by mouth once daily.      furosemide (LASIX) 40 MG tablet Take 40 mg by mouth 2 (two) times daily.      meloxicam (MOBIC) 7.5 MG tablet Take 7.5 mg by mouth once daily.      mupirocin (BACTROBAN) 2 % ointment Apply topically 3 (three) times daily. 30 g 0    pantoprazole (PROTONIX) 40 MG tablet Take 40 mg by mouth once daily.      spironolactone (ALDACTONE) 100 MG tablet Take 100 mg by mouth once daily.       (Not in a hospital admission)      Past Medical History:  Past Medical History:   Diagnosis Date    Hypertension     Rheumatoid arthritis, unspecified       Past Surgical History:  Past Surgical History:   Procedure Laterality Date     SECTION      ERCP N/A 2023    Procedure: ERCP;  Surgeon: Barbra Schrader III, MD;  Location: [facility] ENDOSCOPY;  Service: Gastroenterology;  Laterality: N/A;    ERCP W/ SPHICTEROTOMY  2023    Procedure: ERCP, WITH  SPHINCTEROTOMY;  Surgeon: Barbra Schrader III, MD;  Location: [facility] ENDOSCOPY;  Service: Gastroenterology;;    ERCP, WITH STENT INSERTION  2/16/2023    Procedure: ERCP, WITH STENT INSERTION;  Surgeon: Barbra Schrader III, MD;  Location: [facility] ENDOSCOPY;  Service: Gastroenterology;;    HYSTERECTOMY        Family History:  No family history on file.  Social History:  Social History     Tobacco Use    Smoking status: Never    Smokeless tobacco: Never   Substance Use Topics    Alcohol use: Not Currently     Comment: Lamar 1/2 pt daily       Review of Systems:     Review of Systems   Constitutional:  Negative for appetite change, chills, diaphoresis, fatigue, fever and unexpected weight change.   HENT:  Negative for trouble swallowing.    Respiratory:  Negative for cough, chest tightness and shortness of breath.    Cardiovascular:  Negative for chest pain, palpitations and leg swelling.   Gastrointestinal:  Positive for abdominal pain and constipation. Negative for abdominal distention, blood in stool, diarrhea, nausea, rectal pain and vomiting.   Skin:  Positive for color change. Negative for pallor.   Neurological:  Negative for dizziness, weakness and light-headedness.   Psychiatric/Behavioral:  Negative for confusion.      Objective:     VITAL SIGNS: 24 HR MIN & MAX LAST    Temp  Min: 97.4 °F (36.3 °C)  Max: 97.4 °F (36.3 °C)  97.4 °F (36.3 °C)        BP  Min: 119/84  Max: 135/92  (!) 123/98     Pulse  Min: 67  Max: 105  67     Resp  Min: 15  Max: 20  18    SpO2  Min: 95 %  Max: 100 %  98 %      No intake or output data in the 24 hours ending 02/20/23 1436    Physical Exam  Constitutional:       General: She is not in acute distress.     Appearance: She is not ill-appearing or toxic-appearing.   HENT:      Head: Normocephalic and atraumatic.   Eyes:      General: No scleral icterus.     Extraocular Movements: Extraocular movements intact.   Cardiovascular:      Rate and Rhythm: Normal rate and regular rhythm.    Pulmonary:      Effort: Pulmonary effort is normal. No respiratory distress.   Abdominal:      General: Bowel sounds are normal.      Palpations: Abdomen is soft. There is no mass.      Tenderness: There is abdominal tenderness (mild with deep palpation to RUQ). There is no guarding or rebound.      Comments: Protuberant    Musculoskeletal:         General: Normal range of motion.      Right lower leg: No edema.      Left lower leg: No edema.   Skin:     General: Skin is warm and dry.      Coloration: Skin is jaundiced.   Neurological:      Mental Status: She is alert and oriented to person, place, and time.   Psychiatric:         Mood and Affect: Mood and affect normal.         Recent Results (from the past 48 hour(s))   APTT    Collection Time: 02/20/23  6:29 AM   Result Value Ref Range    PTT 23.3 23.2 - 33.7 seconds   Protime-INR    Collection Time: 02/20/23  6:29 AM   Result Value Ref Range    PT 13.8 12.5 - 14.5 seconds    INR 1.07 0.00 - 1.30   CBC with Differential    Collection Time: 02/20/23  6:29 AM   Result Value Ref Range    WBC 20.5 (H) 4.5 - 11.5 x10(3)/mcL    RBC 3.56 (L) 4.20 - 5.40 x10(6)/mcL    Hgb 11.5 (L) 12.0 - 16.0 g/dL    Hct 31.7 (L) 37.0 - 47.0 %    MCV 89.0 80.0 - 94.0 fL    MCH 32.3 pg    MCHC 36.3 (H) 33.0 - 36.0 g/dL    RDW 17.6 (H) 11.5 - 17.0 %    Platelet 530 (H) 130 - 400 x10(3)/mcL    MPV 12.4 (H) 7.4 - 10.4 fL    Neut % 83.5 %    Lymph % 5.9 %    Mono % 7.4 %    Eos % 0.0 %    Basophil % 0.2 %    Lymph # 1.21 0.6 - 4.6 x10(3)/mcL    Neut # 17.13 (H) 2.1 - 9.2 x10(3)/mcL    Mono # 1.51 (H) 0.1 - 1.3 x10(3)/mcL    Eos # 0.01 0 - 0.9 x10(3)/mcL    Baso # 0.04 0 - 0.2 x10(3)/mcL    IG# 0.62 (H) 0 - 0.04 x10(3)/mcL    IG% 3.0 %    NRBC% 0.0 %   Bilirubin, Direct    Collection Time: 02/20/23  6:29 AM   Result Value Ref Range    Bilirubin Direct 9.8 (H) 0.0 - <0.5 mg/dL   Comprehensive metabolic panel    Collection Time: 02/20/23  7:51 AM   Result Value Ref Range    Sodium Level 131  (L) 136 - 145 mmol/L    Potassium Level 3.8 3.5 - 5.1 mmol/L    Chloride 98 98 - 107 mmol/L    Carbon Dioxide 23 23 - 31 mmol/L    Glucose Level 87 82 - 115 mg/dL    Blood Urea Nitrogen 30.8 (H) 9.8 - 20.1 mg/dL    Creatinine 0.99 0.55 - 1.02 mg/dL    Calcium Level Total 8.0 (L) 8.4 - 10.2 mg/dL    Protein Total 5.1 (L) 5.8 - 7.6 gm/dL    Albumin Level 2.3 (L) 3.4 - 4.8 g/dL    Globulin 2.8 2.4 - 3.5 gm/dL    Albumin/Globulin Ratio 0.8 (L) 1.1 - 2.0 ratio    Bilirubin Total 18.4 (H) <=1.5 mg/dL    Alkaline Phosphatase 267 (H) 40 - 150 unit/L    Alanine Aminotransferase 149 (H) 0 - 55 unit/L    Aspartate Aminotransferase 94 (H) 5 - 34 unit/L    eGFR >60 mls/min/1.73/m2   Lipase    Collection Time: 02/20/23  7:51 AM   Result Value Ref Range    Lipase Level 16 <=60 U/L   Troponin I    Collection Time: 02/20/23  7:51 AM   Result Value Ref Range    Troponin-I <0.010 0.000 - 0.045 ng/mL   Urinalysis, Reflex to Urine Culture    Collection Time: 02/20/23 11:12 AM    Specimen: Urine   Result Value Ref Range    Color, UA Dark Yellow Yellow, Light-Yellow, Dark Yellow, Nicolle, Straw    Appearance, UA Clear Clear    Specific Gravity, UA >=1.040 (H) 1.001 - 1.030    pH, UA 7.0 5.0 - 8.5    Protein, UA Trace (A) Negative mg/dL    Glucose, UA Trace (A) Negative, Normal mg/dL    Ketones, UA Negative Negative mg/dL    Blood, UA Negative Negative unit/L    Bilirubin, UA 2+ (A) Negative mg/dL    Urobilinogen, UA 1.0 0.2, 1.0, Normal mg/dL    Nitrites, UA Negative Negative    Leukocyte Esterase, UA Trace (A) Negative unit/L   Urinalysis, Microscopic    Collection Time: 02/20/23 11:12 AM   Result Value Ref Range    RBC, UA <5 <=5 /HPF    WBC, UA <5 <=5 /HPF    Squamous Epithelial Cells, UA <5 <=5 /HPF    Bacteria, UA None Seen None Seen, Rare, Occasional /HPF   Brain natriuretic peptide    Collection Time: 02/20/23 12:37 PM   Result Value Ref Range    Natriuretic Peptide 70.1 <=100.0 pg/mL       CT Abdomen Pelvis With Contrast    Result  Date: 2/20/2023  EXAMINATION: CT ABDOMEN PELVIS WITH CONTRAST CLINICAL HISTORY: Abdominal abscess/infection suspected;Abdominal pain, acute, nonlocalized;Hx of Pancreatic mass, recent stent placed, abd distention, constipation x5 days, SBO?; TECHNIQUE: Helically acquired images with axial, sagittal and coronal reformations were obtained from the lung bases to the pubic symphysis after the IV administration of contrast. Automated tube current modulation, weight-based exposure dosing, and/or iterative reconstruction technique utilized to reach lowest reasonably achievable exposure rate. DLP: 505 mGy*cm COMPARISON: No relevant prior available for comparison at the time of dictation. FINDINGS: HEART: There are coronary artery calcifications. LUNG BASES: Small right pleural effusion. LIVER: Irregular surface contour of the liver. BILIARY: The gallbladder is distended.  There is sludge.  Tiny focus of gas within the bladder lumen compatible with recent instrumentation.  There is a common bile duct stent in place.  Trace pneumobilia.  No intrahepatic biliary ductal dilatation. PANCREAS: The pancreas atrophied.  Questionable subtle hypodense lesion at the region of the ampulla at the head of the pancreas, measuring approximately 1.8 cm.  There is a stent in the region of the uncinate process extending beyond the expected contour of the pancreas.  There is a mixed density collection in the region of the tip of the stent which extends to encase the SMA and about the celiac and measures approximately 4.5 cm.  No pancreatic ductal dilatation.. SPLEEN: Normal in size ADRENALS: No mass. KIDNEYS/URETERS: The kidneys enhance symmetrically.  No hydronephrosis. GI TRACT/MESENTERY: The bowel is normal in caliber without evidence of obstruction.  Moderate stool burden.  The appendix is not confidently visualized. PERITONEUM: There is small volume free intraperitoneal fluid within the abdomen and pelvis.No free air. LYMPH NODES: No  enlarged lymph nodes by size criteria. VASCULATURE: Aortoiliac atherosclerosis. BLADDER: Normal appearance given degree of distention. REPRODUCTIVE ORGANS: Postop hysterectomy.  There are bilateral adnexal cysts.  Complex septated cystic lesion at the left adnexa measures 9.4 x 4.8 cm with an internal 4.3 cm component with relative increased attenuation.  Right adnexal cystic mass measures 7 cm. SOFT TISSUES: Unremarkable. BONES: Vertebral hemangiomata.  Lumbar spondylosis.     1. No evidence of bowel obstruction.  Moderate stool burden. 2. Recent ERCP with placement of biliary and pancreatic stents.  There is expected pneumobilia.  The gallbladder is moderately distended.  The pancreatic stent extends beyond the expected contour of the margin of the uncinate process.  Tip of the pancreatic stent extends into a mixed density peripancreatic collection extending to encase the SMA and abut the celiac.  No pancreatic or biliary ductal dilatation.  Questionable subtle hypodense mass at the head of the pancreas.  Defer to recent ERCP and pathology results. 3. Free intraperitoneal fluid 4. Bilateral complex cystic adnexal masses Electronically signed by: Bethany Gandhi Date:    02/20/2023 Time:    10:58    FL ERCP Biliary And Pancreatic By Rad Tech    Result Date: 2/16/2023  EXAMINATION: FL ERCP BILIARY AND PANCREATIC CLINICAL HISTORY: Jaundice; TECHNIQUE: Intra-operative fluoroscopy provided during ERCP.  Spot fluoroscopic images were uploaded into PACS.  Radiologist not present for exam.  Report for radiation documentation. FLUOROSCOPY TIME: 3 minute 14 seconds FLUOROSCOPY Reference air Kerma: 65 mGy COMPARISON: Abdominal sonogram 02/03/2023 FINDINGS: ERCP images demonstrate cannulation of the common bile duct and contrast injection. Common duct stent placement.     See operative report for details. Electronically signed by: Bethany Gandhi Date:    02/16/2023 Time:    17:06    X-Ray Chest AP Portable    Result Date:  2/20/2023  EXAMINATION: XR CHEST AP PORTABLE CLINICAL HISTORY: Shortness of breath TECHNIQUE: Single frontal view of the chest was performed. COMPARISON: 11/07/2016 FINDINGS: LINES AND TUBES: EKG/telemetry leads overlie the chest. MEDIASTINUM AND SELENE: Cardiac silhouette is at the upper limits of normal. LUNGS: No lobar consolidation. No edema. PLEURA:Trace right pleural fluid.No pneumothorax. BONES: No acute osseous abnormality.     Trace right pleural effusion. Electronically signed by: Bethany Gandhi Date:    02/20/2023 Time:    12:40    US Abdomen Limited    Result Date: 2/3/2023  EXAMINATION: US ABDOMEN LIMITED CLINICAL HISTORY: , Unspecified abdominal pain. TECHNIQUE: Transverse and longitudinal images of the right upper abdomen were obtained. COMPARISON: None FINDINGS: Liver: Size: 19.1 cm in the right midclavicular line, normal Appearance: Normal  echogenicity, smooth  contour Mass: No focal masses Gallbladder: Stones/Sludge: Echogenic foci that cast distal shadowing are identified representing gallstones echogenic material also seen representing sludge Appearance: No wall thickening, pericholecystic fluid or hydrops Sonographic Dubois's Sign: Negative Bile Ducts: Intrahepatic Ducts: There is evidence of intrahepatic biliary ductal dilatation Extrahepatic Ducts: Common bile duct measures 1.24 cm, is dilated with some tortuosity but no obvious evidence of obstruction Pancreas: Visualized portions of the pancreas with some prominence of the pancreatic duct measuring approximately 3 mm Both kidneys are of normal size shape and contour with no abnormal masses or hydronephrosis. Spleen is free of abnormalities Vessels: Aorta: Visualized portions are normal. Inferior Vena Cava: Visualized portions are normal. Main Portal Vein: Patent with hepatopedal  flow. Free Fluid: No ascites or pleural effusions     Dilated gallbladder with evidence of cholelithiasis. Hepatomegaly. Dilated and tortuous common bile duct with  no evidence of obstruction. Evidence of intrahepatic biliary ductal dilatation This report was flagged in Epic as abnormal. Electronically signed by: Magdy Jimenez Date:    02/03/2023 Time:    10:27      Imaging personally reviewed by myself and SP.    Assessment / Plan:     73 yo F known to Dr. Jak Gonzalez.  Patient with a PMH of HTN and RA previously on methotrexate and Plaquenil.  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  ERCP 2/16/23 with placement of plastic stents in CBD and PD.  CBD brushings: ductal adenocarcinoma.  Here with abdominal pain.  Found to have new leukocytosis, improving LFTs, and CT with fluid collection about the pancreas.     Abnormal imaging of pancreas    CT abdomen/pelvis with IV contrast noted distended gallbladder, sludge, CBD stent in place, trace pneumobilia, no intrahepatic biliary ductal dilation, pancreas atrophy, questionable subtle hypodense lesion at the region of the ampulla the pancreatic head measuring 1.8 cm, stent in the region of the uncinate process extending beyond the expected contour of the pancreas, mixed density collection in the region of the tip of the stent which extends to encase the SMA in about the celiac measuring 4.5 cm, no pancreatic ductal dilation, moderate stool burden, no bowel obstruction.    Lipase normal.    - Keep NPO with IVFs.  - Continue abx.  - Will review imaging with radiology with further recs to follow.   - Likely will remove PD stent on Wedndesday.   - Scheduled for EUS with Dr. Napoles on 3/1.      Thank you for allowing us to participate in this patient's care.

## 2023-02-20 NOTE — PROGRESS NOTES
Pharmacist Renal Dose Adjustment Note    Abby Prabhakar is a 72 y.o. female being treated with the medication cefepime    Patient Data:    Vital Signs (Most Recent):  Temp: 97.4 °F (36.3 °C) (02/20/23 0553)  Pulse: 93 (02/20/23 1232)  Resp: 18 (02/20/23 1232)  BP: 131/88 (02/20/23 1232)  SpO2: 98 % (02/20/23 1232) Vital Signs (72h Range):  Temp:  [97.4 °F (36.3 °C)]   Pulse:  []   Resp:  [15-20]   BP: (119-135)/(72-92)   SpO2:  [95 %-100 %]      Recent Labs   Lab 02/20/23  0751   CREATININE 0.99     Serum creatinine: 0.99 mg/dL 02/20/23 0751  Estimated creatinine clearance: 46.7 mL/min    Cefepime 2 gm IVPB Q8H will be changed to cefepime 2 gm IVPB Q12H based on patient's eCrCl ~ 47 mL/min per pharmacy protocol.    Pharmacist's Name: Mona Lopez, PharmD  Pharmacist's Extension: [phone]

## 2023-02-20 NOTE — Clinical Note
Diagnosis: Pancreatic mass [600279]   Admitting Provider:: ROMÁN AGUILAR [[id number]]   Future Attending Provider: ROMÁN AGUILAR [[id number]]   Reason for IP Medical Treatment  (Clinical interventions that can only be accomplished in the IP setting? ) :: pancreatic mass, recent ERCP, leukocytosis, fluid collection   Estimated Length of Stay:: 2 midnights   I certify that Inpatient services for greater than or equal to 2 midnights are medically necessary:: Yes   Plans for Post-Acute care--if anticipated (pick the single best option):: A. No post acute care anticipated at this time

## 2023-02-20 NOTE — H&P
[facility] Hospital Medicine History & Physical Examination       Patient Name: Abby Prabhakar  MRN: [id number]  Patient Class: IP- Inpatient   Admission Date: 2/20/2023   Admitting Physician: Cisco Ndiaye MD  Length of Stay: 0  Attending Physician: Cisco Ndiaye MD  Primary Care Provider: Juan Manuel Payne MD  Face-to-Face encounter date: 02/20/2023  Code Status:  DNI  Chief Complaint: Constipation (Patient states had stent placement in bile duct last Wednesday.  Complaint of constipation, now bowel movement since Wednesday morning. Nauseated. )        Patient information was obtained from patient, patient's family, past medical records and ER records.     HISTORY OF PRESENT ILLNESS:   Abby Prabhakar is a 72 y.o. female with a past medical history of essential hypertension, atrial fibrillation and rheumatoid arthritis presented to [facility] on 2/20/2023 for constipation and nausea. Patient reports symptoms began 4 days ago after bile duct stent placement by Dr. Schrader. Patient reports last bowel movement was 4 days ago. Patient reports no improvement with home medications. Patient also endorses abdominal pain with intermittent shortness of breath.  Patient denies fever, chills, vomiting, and chest pain.  Initial vital signs in ED were /72, pulse 99, respirations 20, temperature 36.3° C, and SpO2 99% on room air.  Labs revealed WBC 20.5, RBC 3.56, hemoglobin 11 5, hematocrit 31.7, platelets 530, neutrophils 17.13, sodium 131, BUN 30.8, creatinine 0.99, alkaline phosphatase 267, albumin 2.3, bilirubin total 18.4, direct bilirubin 9.8, AST 94, , BNP 70.1, and troponin undetectable. EKG revealed atrial fibrillation with heart rate of 95 bpm.  Chest x-ray revealed trace right pleural effusion.  CT abdomen and pelvis with contrast revealed no evidence of bowel obstruction, moderate stool burden, pneumobilia with recent ERCP with placement of biliary and pancreatic stents, distended  gallbladder, and mixed density peripancreatic collection at tip of pancreatic stent.  Patient had bowel movement in ED after enema.  Patient was given LR bolus, IV morphine 4 mg, IV Zofran 4 mg, and IV cefepime 2 g in ED.  GI was consulted.  Patient was admitted to hospital medicine service for further medical management.     PAST MEDICAL HISTORY:   Essential hypertension  Atrial fibrillation   Rheumatoid arthritis    PAST SURGICAL HISTORY:     Past Surgical History:   Procedure Laterality Date     SECTION      ERCP N/A 2023    Procedure: ERCP;  Surgeon: Barbra Schrader III, MD;  Location: [facility] ENDOSCOPY;  Service: Gastroenterology;  Laterality: N/A;    ERCP W/ SPHICTEROTOMY  2023    Procedure: ERCP, WITH SPHINCTEROTOMY;  Surgeon: Barbra Schrader III, MD;  Location: [facility] ENDOSCOPY;  Service: Gastroenterology;;    ERCP, WITH STENT INSERTION  2023    Procedure: ERCP, WITH STENT INSERTION;  Surgeon: Barbra Schrader III, MD;  Location: [facility] ENDOSCOPY;  Service: Gastroenterology;;    HYSTERECTOMY         ALLERGIES:   Atorvastatin and Penicillins    FAMILY HISTORY:   Reviewed and negative    SOCIAL HISTORY:   Former alcohol use-quit 1 month ago, previously drank 1 pint of vodka per day   Denies tobacco and drug use    HOME MEDICATIONS:     Prior to Admission medications    Medication Sig Start Date End Date Taking? Authorizing Provider   ALPRAZolam (XANAX) 0.5 MG tablet Take 0.5 mg by mouth 3 (three) times daily.    Historical Provider   colestipoL (COLESTID) 5 gram granules Take 5 g by mouth 2 (two) times daily.    Historical Provider   diltiaZEM (DILACOR XR) 120 MG CDCR Take 120 mg by mouth once daily.    Historical Provider   folic acid (FOLVITE) 1 MG tablet Take 1 mg by mouth once daily.    Historical Provider   furosemide (LASIX) 40 MG tablet Take 40 mg by mouth 2 (two) times daily.    Historical Provider   meloxicam (MOBIC) 7.5 MG tablet Take 7.5 mg by mouth once daily.     Historical Provider   mupirocin (BACTROBAN) 2 % ointment Apply topically 3 (three) times daily. 7/13/22   Ady Estrella MD   pantoprazole (PROTONIX) 40 MG tablet Take 40 mg by mouth once daily.    Historical Provider   spironolactone (ALDACTONE) 100 MG tablet Take 100 mg by mouth once daily.    Historical Provider       REVIEW OF SYSTEMS:   Except as documented, all other systems reviewed and negative     PHYSICAL EXAM:     VITAL SIGNS: 24 HRS MIN & MAX LAST   Temp  Min: 97.4 °F (36.3 °C)  Max: 97.4 °F (36.3 °C) 97.4 °F (36.3 °C)   BP  Min: 119/84  Max: 135/92 132/73   Pulse  Min: 82  Max: 105  82   Resp  Min: 15  Max: 20 19   SpO2  Min: 95 %  Max: 100 % 100 %       General appearance:  Female in no apparent distress.  HEENNT: Atraumatic head.   Lungs:  Bibasilar crackles   Heart:  Irregular rhythm.  Tachycardia  Abdomen:  Abdomen distended.  Right upper quadrant tenderness.  Bowel sounds normal.  Extremities: No cyanosis, clubbing, or edema. No deformities.   Skin:  Jaundice  Neuro: Awake, alert, and oriented. Motor and sensory exams grossly intact.   Psych/mental status: Appropriate mood and affect. Responds appropriately to questions.     LABS AND IMAGING:     Recent Labs   Lab 02/20/23  0629   WBC 20.5*   RBC 3.56*   HGB 11.5*   HCT 31.7*   MCV 89.0   MCH 32.3   MCHC 36.3*   RDW 17.6*   *   MPV 12.4*       Recent Labs   Lab 02/20/23  0751   *   K 3.8   CO2 23   BUN 30.8*   CREATININE 0.99   CALCIUM 8.0*   ALBUMIN 2.3*   ALKPHOS 267*   *   AST 94*   BILITOT 18.4*       Microbiology Results (last 7 days)       ** No results found for the last 168 hours. **             X-Ray Chest AP Portable  Narrative: EXAMINATION:  XR CHEST AP PORTABLE    CLINICAL HISTORY:  Shortness of breath    TECHNIQUE:  Single frontal view of the chest was performed.    COMPARISON:  11/07/2016    FINDINGS:  LINES AND TUBES: EKG/telemetry leads overlie the chest.    MEDIASTINUM AND SELENE: Cardiac silhouette is at the  upper limits of normal.    LUNGS: No lobar consolidation. No edema.    PLEURA:Trace right pleural fluid.No pneumothorax.    BONES: No acute osseous abnormality.  Impression: Trace right pleural effusion.    Electronically signed by: Bethany Gandhi  Date:    02/20/2023  Time:    12:40  CT Abdomen Pelvis With Contrast  Narrative: EXAMINATION:  CT ABDOMEN PELVIS WITH CONTRAST    CLINICAL HISTORY:  Abdominal abscess/infection suspected;Abdominal pain, acute, nonlocalized;Hx of Pancreatic mass, recent stent placed, abd distention, constipation x5 days, SBO?;    TECHNIQUE:  Helically acquired images with axial, sagittal and coronal reformations were obtained from the lung bases to the pubic symphysis after the IV administration of contrast.    Automated tube current modulation, weight-based exposure dosing, and/or iterative reconstruction technique utilized to reach lowest reasonably achievable exposure rate.    DLP: 505 mGy*cm    COMPARISON:  No relevant prior available for comparison at the time of dictation.    FINDINGS:  HEART: There are coronary artery calcifications.    LUNG BASES: Small right pleural effusion.    LIVER: Irregular surface contour of the liver.    BILIARY: The gallbladder is distended.  There is sludge.  Tiny focus of gas within the bladder lumen compatible with recent instrumentation.  There is a common bile duct stent in place.  Trace pneumobilia.  No intrahepatic biliary ductal dilatation.    PANCREAS: The pancreas atrophied.  Questionable subtle hypodense lesion at the region of the ampulla at the head of the pancreas, measuring approximately 1.8 cm.  There is a stent in the region of the uncinate process extending beyond the expected contour of the pancreas.  There is a mixed density collection in the region of the tip of the stent which extends to encase the SMA and about the celiac and measures approximately 4.5 cm.  No pancreatic ductal dilatation..    SPLEEN: Normal in size    ADRENALS: No  mass.    KIDNEYS/URETERS: The kidneys enhance symmetrically.  No hydronephrosis.    GI TRACT/MESENTERY: The bowel is normal in caliber without evidence of obstruction.  Moderate stool burden.  The appendix is not confidently visualized.    PERITONEUM: There is small volume free intraperitoneal fluid within the abdomen and pelvis.No free air.    LYMPH NODES: No enlarged lymph nodes by size criteria.    VASCULATURE: Aortoiliac atherosclerosis.    BLADDER: Normal appearance given degree of distention.    REPRODUCTIVE ORGANS: Postop hysterectomy.  There are bilateral adnexal cysts.  Complex septated cystic lesion at the left adnexa measures 9.4 x 4.8 cm with an internal 4.3 cm component with relative increased attenuation.  Right adnexal cystic mass measures 7 cm.    SOFT TISSUES: Unremarkable.    BONES: Vertebral hemangiomata.  Lumbar spondylosis.  Impression: 1. No evidence of bowel obstruction.  Moderate stool burden.  2. Recent ERCP with placement of biliary and pancreatic stents.  There is expected pneumobilia.  The gallbladder is moderately distended.  The pancreatic stent extends beyond the expected contour of the margin of the uncinate process.  Tip of the pancreatic stent extends into a mixed density peripancreatic collection extending to encase the SMA and abut the celiac.  No pancreatic or biliary ductal dilatation.  Questionable subtle hypodense mass at the head of the pancreas.  Defer to recent ERCP and pathology results.  3. Free intraperitoneal fluid  4. Bilateral complex cystic adnexal masses    Electronically signed by: Bethany Gandhi  Date:    02/20/2023  Time:    10:58        ASSESSMENT & PLAN:   Assessment:  Peripancreatic mixed density collection s/p biliary stent on 2/16/2023  Leukocytosis  Pancreatic mass-cytology positive for adenocarcinoma   Hyperbilirubinemia  Jaundice   Pruritis   Transaminitis  Thrombocytosis  Atrial fibrillation  Constipation   Hyponatremia  Essential hypertension    Rheumatoid arthritis    Plan:  IV Cefepime  IVF  Blood cultures pending   GI consulted, appreciate recommendations  PRN Tramadol 50 mg QID  PRN Benadryl 25 mg PRN QID for itching   PRN Antiemetics  Cardiology consulted, appreciate recommendations  Continue appropriate home medications  Labs in a.m.    VTE Prophylaxis:  SCDs    __________________________________________________________________________  INPATIENT LIST OF MEDICATIONS     Scheduled Meds:   ceFEPime (MAXIPIME) IVPB  2 g Intravenous Q12H    morphine  4 mg Intravenous ED 1 Time    ondansetron  4 mg Intravenous ED 1 Time    sodium phosphates  1 enema Rectal ED 1 Time     Continuous Infusions:  PRN Meds:.      IIsrael PA-C, have reviewed and discussed the case with Dr. Cisco Ndiaye MD  Please see the following addendum for further assessment and plan from there attending MD.    02/20/2023    ________________________________________________________________________________    MD Addendum:  I,  assumed care of this patient today at ---am/pm  For the patient encounter, I performed the substantive portion of the visit, I reviewed the PA documentation, treatment plan, and medical decision making.  I had face to face time with this patient     A. History:    B. Physical exam:    C. Medical decision making:    Discharge Planning and Disposition: No mobility needs. Ambulating well. Good social support system.   Anticipated discharge    All diagnosis and differential diagnosis have been reviewed; assessment and plan has been documented; I have personally reviewed the labs and test results that are presently available; I have reviewed the patients medication list; I have reviewed the consulting providers response and recommendations. I have reviewed or attempted to review medical records based upon their availability.    All of the patient and family questions have been addressed and answered. Patient's is agreeable to the above stated plan. I will continue  to monitor closely and make adjustments to medical management as needed.      02/20/2023

## 2023-02-20 NOTE — ED PROVIDER NOTES
Encounter Date: 2/20/2023    SCRIBE #1 NOTE: I, Ann-Marie Navarrete, am scribing for, and in the presence of,  Alcides Freeman MD. I have scribed the following portions of the note - the EKG reading. Other sections scribed: HPI, ROS and physical.     History     Chief Complaint   Patient presents with    Constipation     Patient states had stent placement in bile duct last Wednesday.  Complaint of constipation, now bowel movement since Wednesday morning. Nauseated.      71 y/o female with a medical hx of HTN, pancreatitic cancer presents to the ED for constipation onset four days. The pt reports that she had an endoscopy by Dr. Schrader four days ago with bile stents placed. She reports that she has been having constipation since the endoscopy and her last BM was four days ago. The pt states that she has tried to take laxatives, milk of magnesia, mineral oil and an enema with no relief. Reports back pain. Nausea, constipation. Denies fever, use of pain medication.     The pt's PCP is Scar Ward MD.     The history is provided by the patient and a friend. No  was used.   Constipation   The current episode started several days ago. The problem occurs continuously. The problem has been unchanged. There was no prior successful therapy. Prior unsuccessful therapies include stool softeners, enemas, laxatives and mineral oil. Associated symptoms include abdominal pain and nausea. Pertinent negatives include no fever, no chest pain and no rash. She has been Eating and drinking normally. Urine output has been normal. Her past medical history is significant for abdominal surgery. There were sick contacts none. Recently, medical care has been given by a specialist. Services received include tests performed.         Review of patient's allergies indicates:   Allergen Reactions    Atorvastatin     Penicillins      Past Medical History:   Diagnosis Date    Hypertension     Rheumatoid arthritis, unspecified       Past Surgical History:   Procedure Laterality Date     SECTION      EGD, WITH FOREIGN BODY REMOVAL  2023    Procedure: EGD, WITH FOREIGN BODY REMOVAL;  Surgeon: Santhosh Dupree MD;  Location: [facility] ENDOSCOPY;  Service: Gastroenterology;;  pancreatic duct stent removal with fluoro    ERCP N/A 2023    Procedure: ERCP;  Surgeon: Barbra Schrader III, MD;  Location: [facility] ENDOSCOPY;  Service: Gastroenterology;  Laterality: N/A;    ERCP W/ SPHICTEROTOMY  2023    Procedure: ERCP, WITH SPHINCTEROTOMY;  Surgeon: Barbra Schrader III, MD;  Location: [facility] ENDOSCOPY;  Service: Gastroenterology;;    ERCP, WITH STENT INSERTION  2023    Procedure: ERCP, WITH STENT INSERTION;  Surgeon: Barbra Schrader III, MD;  Location: [facility] ENDOSCOPY;  Service: Gastroenterology;;    ESOPHAGOGASTRODUODENOSCOPY N/A 2023    Procedure: EGD (ESOPHAGOGASTRODUODENOSCOPY);  Surgeon: Santhosh Dupree MD;  Location: [facility] ENDOSCOPY;  Service: Gastroenterology;  Laterality: N/A;    HYSTERECTOMY       History reviewed. No pertinent family history.  Social History     Tobacco Use    Smoking status: Never    Smokeless tobacco: Never   Substance Use Topics    Alcohol use: Not Currently     Comment: Rolodka 1/2 pt daily    Drug use: Never     Review of Systems   Constitutional:  Negative for fever.   HENT:  Negative for sore throat.    Eyes:  Negative for visual disturbance.   Respiratory:  Negative for shortness of breath.    Cardiovascular:  Negative for chest pain.   Gastrointestinal:  Positive for abdominal pain, constipation and nausea.   Genitourinary:  Negative for dysuria.   Musculoskeletal:  Positive for back pain. Negative for joint swelling.   Skin:  Negative for rash.   Neurological:  Negative for weakness.   Psychiatric/Behavioral:  Negative for confusion.      Physical Exam     Initial Vitals [23 0553]   BP Pulse Resp Temp SpO2   120/72 99 20 97.4 °F (36.3 °C) 99 %      MAP        --         Physical Exam    Nursing note and vitals reviewed.  Constitutional: She appears well-developed and well-nourished.   HENT:   Head: Normocephalic and atraumatic.   Eyes: EOM are normal. Pupils are equal, round, and reactive to light. Scleral icterus is present.   Neck:   Normal range of motion.  Cardiovascular:  Regular rhythm, normal heart sounds and intact distal pulses.   Tachycardia present.         No murmur heard.  Pulmonary/Chest: Breath sounds normal. No respiratory distress. She has no wheezes. She has no rales.   Abdominal: Abdomen is soft. She exhibits distension. There is abdominal tenderness in the right upper quadrant and epigastric area. There is no rebound.   Musculoskeletal:         General: No tenderness or edema. Normal range of motion.      Cervical back: Normal range of motion.     Neurological: She is alert. She has normal strength. No cranial nerve deficit. GCS score is 15. GCS eye subscore is 4. GCS verbal subscore is 5. GCS motor subscore is 6.   Skin: Skin is warm and dry. Capillary refill takes less than 2 seconds. No rash noted. No erythema.   Jaundice   Psychiatric: She has a normal mood and affect.       ED Course   Procedures  Labs Reviewed   COMPREHENSIVE METABOLIC PANEL - Abnormal; Notable for the following components:       Result Value    Sodium Level 131 (*)     Blood Urea Nitrogen 30.8 (*)     Calcium Level Total 8.0 (*)     Protein Total 5.1 (*)     Albumin Level 2.3 (*)     Albumin/Globulin Ratio 0.8 (*)     Bilirubin Total 18.4 (*)     Alkaline Phosphatase 267 (*)     Alanine Aminotransferase 149 (*)     Aspartate Aminotransferase 94 (*)     All other components within normal limits   URINALYSIS, REFLEX TO URINE CULTURE - Abnormal; Notable for the following components:    Specific Gravity, UA >=1.040 (*)     Protein, UA Trace (*)     Glucose, UA Trace (*)     Bilirubin, UA 2+ (*)     Leukocyte Esterase, UA Trace (*)     All other components within normal limits    CBC WITH DIFFERENTIAL - Abnormal; Notable for the following components:    WBC 20.5 (*)     RBC 3.56 (*)     Hgb 11.5 (*)     Hct 31.7 (*)     MCHC 36.3 (*)     RDW 17.6 (*)     Platelet 530 (*)     MPV 12.4 (*)     Neut # 17.13 (*)     Mono # 1.51 (*)     IG# 0.62 (*)     All other components within normal limits   BILIRUBIN, DIRECT - Abnormal; Notable for the following components:    Bilirubin Direct 9.8 (*)     All other components within normal limits   APTT - Normal   PROTIME-INR - Normal   LIPASE - Normal   URINALYSIS, MICROSCOPIC - Normal   B-TYPE NATRIURETIC PEPTIDE - Normal   TROPONIN I - Normal   CBC W/ AUTO DIFFERENTIAL    Narrative:     The following orders were created for panel order CBC auto differential.  Procedure                               Abnormality         Status                     ---------                               -----------         ------                     CBC with Differential[[id number]]        Abnormal            Final result                 Please view results for these tests on the individual orders.     EKG Readings: (Independently Interpreted)   Initial Reading: No STEMI. Rhythm: Atrial Fibrillation. Heart Rate: 95. Ectopy: No Ectopy. Conduction: Normal. ST Segments: Normal ST Segments. T Waves: Normal. Clinical Impression: Atrial Fibrillation   1330.   ECG Results              EKG 12-lead (Final result)  Result time 02/20/23 23:04:27      Final result by Interface, Lab In [facility] (02/20/23 23:04:27)                   Narrative:    Test Reason : R06.02,    Vent. Rate : 095 BPM     Atrial Rate : 000 BPM     P-R Int : 000 ms          QRS Dur : 082 ms      QT Int : 366 ms       P-R-T Axes : 000 019 069 degrees     QTc Int : 459 ms    Normal sinus rhythm  Atrial premature complexes  Abnormal ECG  No previous ECGs available  Confirmed by Nico Newman MD ([id number]) on 2/20/2023 11:04:19 PM    Referred By: [email or web address]   SELF           Confirmed By:Nico Newman MD                                   Imaging Results              X-Ray Chest AP Portable (Final result)  Result time 02/20/23 12:40:36      Final result by Bethany Gandhi MD (02/20/23 12:40:36)                   Impression:      Trace right pleural effusion.      Electronically signed by: Bethany Gandhi  Date:    02/20/2023  Time:    12:40               Narrative:    EXAMINATION:  XR CHEST AP PORTABLE    CLINICAL HISTORY:  Shortness of breath    TECHNIQUE:  Single frontal view of the chest was performed.    COMPARISON:  11/07/2016    FINDINGS:  LINES AND TUBES: EKG/telemetry leads overlie the chest.    MEDIASTINUM AND SELENE: Cardiac silhouette is at the upper limits of normal.    LUNGS: No lobar consolidation. No edema.    PLEURA:Trace right pleural fluid.No pneumothorax.    BONES: No acute osseous abnormality.                                       CT Abdomen Pelvis With Contrast (Final result)  Result time 02/20/23 10:58:43      Final result by Bethany Gandhi MD (02/20/23 10:58:43)                   Impression:      1. No evidence of bowel obstruction.  Moderate stool burden.  2. Recent ERCP with placement of biliary and pancreatic stents.  There is expected pneumobilia.  The gallbladder is moderately distended.  The pancreatic stent extends beyond the expected contour of the margin of the uncinate process.  Tip of the pancreatic stent extends into a mixed density peripancreatic collection extending to encase the SMA and abut the celiac.  No pancreatic or biliary ductal dilatation.  Questionable subtle hypodense mass at the head of the pancreas.  Defer to recent ERCP and pathology results.  3. Free intraperitoneal fluid  4. Bilateral complex cystic adnexal masses      Electronically signed by: Bethany Gandhi  Date:    02/20/2023  Time:    10:58               Narrative:    EXAMINATION:  CT ABDOMEN PELVIS WITH CONTRAST    CLINICAL HISTORY:  Abdominal abscess/infection suspected;Abdominal pain, acute, nonlocalized;Hx of  Pancreatic mass, recent stent placed, abd distention, constipation x5 days, SBO?;    TECHNIQUE:  Helically acquired images with axial, sagittal and coronal reformations were obtained from the lung bases to the pubic symphysis after the IV administration of contrast.    Automated tube current modulation, weight-based exposure dosing, and/or iterative reconstruction technique utilized to reach lowest reasonably achievable exposure rate.    DLP: 505 mGy*cm    COMPARISON:  No relevant prior available for comparison at the time of dictation.    FINDINGS:  HEART: There are coronary artery calcifications.    LUNG BASES: Small right pleural effusion.    LIVER: Irregular surface contour of the liver.    BILIARY: The gallbladder is distended.  There is sludge.  Tiny focus of gas within the bladder lumen compatible with recent instrumentation.  There is a common bile duct stent in place.  Trace pneumobilia.  No intrahepatic biliary ductal dilatation.    PANCREAS: The pancreas atrophied.  Questionable subtle hypodense lesion at the region of the ampulla at the head of the pancreas, measuring approximately 1.8 cm.  There is a stent in the region of the uncinate process extending beyond the expected contour of the pancreas.  There is a mixed density collection in the region of the tip of the stent which extends to encase the SMA and about the celiac and measures approximately 4.5 cm.  No pancreatic ductal dilatation..    SPLEEN: Normal in size    ADRENALS: No mass.    KIDNEYS/URETERS: The kidneys enhance symmetrically.  No hydronephrosis.    GI TRACT/MESENTERY: The bowel is normal in caliber without evidence of obstruction.  Moderate stool burden.  The appendix is not confidently visualized.    PERITONEUM: There is small volume free intraperitoneal fluid within the abdomen and pelvis.No free air.    LYMPH NODES: No enlarged lymph nodes by size criteria.    VASCULATURE: Aortoiliac atherosclerosis.    BLADDER: Normal appearance  given degree of distention.    REPRODUCTIVE ORGANS: Postop hysterectomy.  There are bilateral adnexal cysts.  Complex septated cystic lesion at the left adnexa measures 9.4 x 4.8 cm with an internal 4.3 cm component with relative increased attenuation.  Right adnexal cystic mass measures 7 cm.    SOFT TISSUES: Unremarkable.    BONES: Vertebral hemangiomata.  Lumbar spondylosis.                                    X-Rays:   Independently Interpreted Readings:   Chest X-Ray: No infiltrates.   Medications   morphine injection 4 mg (has no administration in time range)   ondansetron injection 4 mg (has no administration in time range)   sodium phosphates 19-7 gram/118 mL enema 1 enema (has no administration in time range)   LIDOcaine 5 % patch 1 patch (has no administration in time range)   lactated ringers bolus 1,000 mL (1,000 mLs Intravenous New Bag 2/20/23 0630)   iopamidoL (ISOVUE-370) injection 100 mL (100 mLs Intravenous Given 2/20/23 0922)   methylnaltrexone 12 mg/0.6 mL subcutaneous injection 12 mg (12 mg Subcutaneous Given 2/22/23 1218)   indomethacin (INDOCIN) 50 mg suppository (100 mg  Given 2/23/23 1300)   methylnaltrexone 12 mg/0.6 mL subcutaneous injection 12 mg (12 mg Subcutaneous Given 2/24/23 1427)     Medical Decision Making:   History:   I obtained history from: someone other than patient.       <> Summary of History: Patient's friend at bedside reports ERCP 4 days ago  Old Medical Records: I decided to obtain old medical records.  Old Records Summarized: records from clinic visits and records from previous admission(s).       <> Summary of Records: Hx of pancreatic cancer  Initial Assessment:   Abdominal pain/distension  Differential Diagnosis:   Judging by the patient's chief complaint and pertinent history, the patient has the following possible differential diagnoses, including but not limited to the following.  Some of these are deemed to be lower likelihood and some more likely based on my  physical exam and history combined with possible lab work and/or imaging studies.   Please see the pertinent studies, and refer to the HPI.  Some of these diagnoses will take further evaluation to fully rule out, perhaps as an outpatient and the patient was encouraged to follow up when discharged for more comprehensive evaluation.    SBO: Constipation appendicitis, biliary disease, diverticulitis,  intraabdominal abcess, retroperitoneal abcess, gastritis, gastroenteritis, hepatitis, hernia, pancreatitis, inflammatory bowel disease, PUD, SBP, nephrolithiasis, GERD, IBS    Independently Interpreted Test(s):   I have ordered and independently interpreted EKG Reading(s) - see prior notes  Clinical Tests:   Lab Tests: Ordered and Reviewed  Radiological Study: Ordered and Reviewed  Medical Tests: Ordered and Reviewed  ED Management:  Patient is a 72-year-old female with past medical history pancreatic cancer, jaundice, biliary obstruction status post ERCP and biliary stent placement 4 days ago presents for constipation, abdominal pain and distention.  Patient states she is tried laxatives, milk of magnesia, enemas without relief.  She denies any previous history of constipation.  Does not take pain medication on regular basis.  Decreased p.o. intake.  Imaging obtained to evaluate for possible small-bowel obstruction, evaluation of abdomen after ERCP.  Labs and imaging as noted.  All results discussed.  Answered all questions time.  Given IV fluids, pain medication, nausea medication.    Lab work is noted, elevated bilirubin trending down.  Discussed with gastroenterology who is evaluated the patient.  Recommends admission to Medicine.  Other:   I have discussed this case with another health care provider.  Discussed with gastroenterology who recommends admission to Medicine.  Discussed with medicine who will admit the patient.        Scribe Attestation:   Scribe #1: I performed the above scribed service and the  documentation accurately describes the services I performed. I attest to the accuracy of the note.    Attending Attestation:           Physician Attestation for Scribe:  Physician Attestation Statement for Scribe #1: I, Alcides Freeman MD, reviewed documentation, as scribed by Ann-Marie Navarrete in my presence, and it is both accurate and complete.           ED Course as of 03/07/23 1407   Mon Feb 20, 2023   0833 BILIRUBIN TOTAL(!): 18.4  Improving from previous.  [RP]   1237 Spoke to GI will admit. GI will  take a look at fluid collection.  [SJ]   1249 Hospital medicine [SJ]      ED Course User Index  [RP] Alcides Freeman MD  [] Ann-Marie Navarrete                 Medical Decision Making  Problems Addressed:  Atrial fibrillation, unspecified type: acute illness or injury  Elevated bilirubin: chronic illness or injury  Jaundice: acute illness or injury that poses a threat to life or bodily functions  Pancreatic mass: acute illness or injury  Right upper quadrant abdominal pain: acute illness or injury that poses a threat to life or bodily functions    Amount and/or Complexity of Data Reviewed  Independent Historian: friend     Details: The pt's neighbor was in the room and reported that she recently was diagnosed with a malignant tumor near her pancreas.  External Data Reviewed: labs, radiology and notes.     Details: The pt had an endoscopy completed by Dr. Schrader on February 16, 2023. The pt has the endoscopy completed with the placement of two stents due to elevated bilirubin.  Labs: ordered.  Radiology: ordered.  ECG/medicine tests: ordered and independent interpretation performed.     Details: see prior notes    Risk  Prescription drug management.  Parenteral controlled substances.  Decision regarding hospitalization.       Clinical Impression:   Final diagnoses:  [R10.11] Right upper quadrant abdominal pain (Primary)  [R17] Jaundice  [R17] Elevated bilirubin  [R06.02] SOB (shortness of breath)  [K86.89] Pancreatic  mass  [I48.91] Atrial fibrillation, unspecified type        ED Disposition Condition    Admit Stable                Alcides Freeman MD  03/07/23 2105

## 2023-02-21 PROBLEM — C25.9 PANCREATIC CANCER: Status: ACTIVE | Noted: 2023-02-21

## 2023-02-21 LAB
ALBUMIN SERPL-MCNC: 2 G/DL (ref 3.4–4.8)
ALBUMIN/GLOB SERPL: 0.9 RATIO (ref 1.1–2)
ALP SERPL-CCNC: 220 UNIT/L (ref 40–150)
ALT SERPL-CCNC: 112 UNIT/L (ref 0–55)
AST SERPL-CCNC: 62 UNIT/L (ref 5–34)
AV INDEX (PROSTH): 0.59
AV MEAN GRADIENT: 3 MMHG
AV PEAK GRADIENT: 6 MMHG
AV VALVE AREA: 1.85 CM2
AV VELOCITY RATIO: 0.63
BASOPHILS # BLD AUTO: 0.03 X10(3)/MCL (ref 0–0.2)
BASOPHILS NFR BLD AUTO: 0.2 %
BILIRUBIN DIRECT+TOT PNL SERPL-MCNC: 13.5 MG/DL
BSA FOR ECHO PROCEDURE: 1.68 M2
BUN SERPL-MCNC: 24.2 MG/DL (ref 9.8–20.1)
CALCIUM SERPL-MCNC: 7.6 MG/DL (ref 8.4–10.2)
CHLORIDE SERPL-SCNC: 98 MMOL/L (ref 98–107)
CO2 SERPL-SCNC: 23 MMOL/L (ref 23–31)
CREAT SERPL-MCNC: 0.8 MG/DL (ref 0.55–1.02)
CV ECHO LV RWT: 0.38 CM
DOP CALC AO PEAK VEL: 1.19 M/S
DOP CALC AO VTI: 29.1 CM
DOP CALC LVOT AREA: 3.1 CM2
DOP CALC LVOT DIAMETER: 2 CM
DOP CALC LVOT PEAK VEL: 0.75 M/S
DOP CALC LVOT STROKE VOLUME: 53.69 CM3
DOP CALC MV VTI: 24.1 CM
DOP CALCLVOT PEAK VEL VTI: 17.1 CM
E/A RATIO: 1.95
E/E' RATIO: 7.09 M/S
ECHO LV POSTERIOR WALL: 0.92 CM (ref 0.6–1.1)
EJECTION FRACTION: 56 %
EOSINOPHIL # BLD AUTO: 0.04 X10(3)/MCL (ref 0–0.9)
EOSINOPHIL NFR BLD AUTO: 0.3 %
ERYTHROCYTE [DISTWIDTH] IN BLOOD BY AUTOMATED COUNT: 17.4 % (ref 11.5–17)
FRACTIONAL SHORTENING: 35 % (ref 28–44)
GFR SERPLBLD CREATININE-BSD FMLA CKD-EPI: >60 MLS/MIN/1.73/M2
GLOBULIN SER-MCNC: 2.3 GM/DL (ref 2.4–3.5)
GLUCOSE SERPL-MCNC: 97 MG/DL (ref 82–115)
HCT VFR BLD AUTO: 29.5 % (ref 37–47)
HGB BLD-MCNC: 10.4 G/DL (ref 12–16)
IMM GRANULOCYTES # BLD AUTO: 0.5 X10(3)/MCL (ref 0–0.04)
IMM GRANULOCYTES NFR BLD AUTO: 3.5 %
INTERVENTRICULAR SEPTUM: 0.92 CM (ref 0.6–1.1)
LEFT ATRIUM SIZE: 3.9 CM
LEFT ATRIUM VOLUME INDEX MOD: 34.3 ML/M2
LEFT ATRIUM VOLUME MOD: 56.9 CM3
LEFT INTERNAL DIMENSION IN SYSTOLE: 3.1 CM (ref 2.1–4)
LEFT VENTRICLE DIASTOLIC VOLUME INDEX: 64.46 ML/M2
LEFT VENTRICLE DIASTOLIC VOLUME: 107 ML
LEFT VENTRICLE MASS INDEX: 91 G/M2
LEFT VENTRICLE SYSTOLIC VOLUME INDEX: 22.8 ML/M2
LEFT VENTRICLE SYSTOLIC VOLUME: 37.9 ML
LEFT VENTRICULAR INTERNAL DIMENSION IN DIASTOLE: 4.79 CM (ref 3.5–6)
LEFT VENTRICULAR MASS: 151.63 G
LV LATERAL E/E' RATIO: 6 M/S
LV SEPTAL E/E' RATIO: 8.67 M/S
LVOT MG: 1 MMHG
LVOT MV: 0.49 CM/S
LYMPHOCYTES # BLD AUTO: 0.8 X10(3)/MCL (ref 0.6–4.6)
LYMPHOCYTES NFR BLD AUTO: 5.6 %
MCH RBC QN AUTO: 32 PG
MCHC RBC AUTO-ENTMCNC: 35.3 G/DL (ref 33–36)
MCV RBC AUTO: 90.8 FL (ref 80–94)
MONOCYTES # BLD AUTO: 1.13 X10(3)/MCL (ref 0.1–1.3)
MONOCYTES NFR BLD AUTO: 7.9 %
MV MEAN GRADIENT: 1 MMHG
MV PEAK A VEL: 0.4 M/S
MV PEAK E VEL: 0.78 M/S
MV PEAK GRADIENT: 3 MMHG
MV STENOSIS PRESSURE HALF TIME: 84 MS
MV VALVE AREA BY CONTINUITY EQUATION: 2.23 CM2
MV VALVE AREA P 1/2 METHOD: 2.62 CM2
NEUTROPHILS # BLD AUTO: 11.84 X10(3)/MCL (ref 2.1–9.2)
NEUTROPHILS NFR BLD AUTO: 82.5 %
NRBC BLD AUTO-RTO: 0 %
PISA TR MAX VEL: 2.2 M/S
PLATELET # BLD AUTO: 415 X10(3)/MCL (ref 130–400)
PMV BLD AUTO: 11.3 FL (ref 7.4–10.4)
POTASSIUM SERPL-SCNC: 3.9 MMOL/L (ref 3.5–5.1)
PROT SERPL-MCNC: 4.3 GM/DL (ref 5.8–7.6)
RA PRESSURE: 3 MMHG
RBC # BLD AUTO: 3.25 X10(6)/MCL (ref 4.2–5.4)
SINUS: 3.6 CM
SODIUM SERPL-SCNC: 130 MMOL/L (ref 136–145)
TDI LATERAL: 0.13 M/S
TDI SEPTAL: 0.09 M/S
TDI: 0.11 M/S
TR MAX PG: 19 MMHG
TRICUSPID ANNULAR PLANE SYSTOLIC EXCURSION: 1.83 CM
TV REST PULMONARY ARTERY PRESSURE: 22 MMHG
WBC # SPEC AUTO: 14.3 X10(3)/MCL (ref 4.5–11.5)

## 2023-02-21 PROCEDURE — 25000003 PHARM REV CODE 250: Performed by: INTERNAL MEDICINE

## 2023-02-21 PROCEDURE — 63600175 PHARM REV CODE 636 W HCPCS: Performed by: STUDENT IN AN ORGANIZED HEALTH CARE EDUCATION/TRAINING PROGRAM

## 2023-02-21 PROCEDURE — 99223 PR INITIAL HOSPITAL CARE,LEVL III: ICD-10-PCS | Mod: ,,, | Performed by: INTERNAL MEDICINE

## 2023-02-21 PROCEDURE — 80053 COMPREHEN METABOLIC PANEL: CPT | Performed by: PHYSICIAN ASSISTANT

## 2023-02-21 PROCEDURE — 25000003 PHARM REV CODE 250: Performed by: STUDENT IN AN ORGANIZED HEALTH CARE EDUCATION/TRAINING PROGRAM

## 2023-02-21 PROCEDURE — 25000003 PHARM REV CODE 250: Performed by: NURSE PRACTITIONER

## 2023-02-21 PROCEDURE — 25000003 PHARM REV CODE 250: Performed by: PHYSICIAN ASSISTANT

## 2023-02-21 PROCEDURE — 21400001 HC TELEMETRY ROOM

## 2023-02-21 PROCEDURE — 11000001 HC ACUTE MED/SURG PRIVATE ROOM

## 2023-02-21 PROCEDURE — 85025 COMPLETE CBC W/AUTO DIFF WBC: CPT | Performed by: PHYSICIAN ASSISTANT

## 2023-02-21 PROCEDURE — 99223 1ST HOSP IP/OBS HIGH 75: CPT | Mod: ,,, | Performed by: INTERNAL MEDICINE

## 2023-02-21 RX ORDER — MONTELUKAST SODIUM 4 MG/1
2 TABLET, CHEWABLE ORAL DAILY
Status: DISCONTINUED | OUTPATIENT
Start: 2023-02-21 | End: 2023-02-22

## 2023-02-21 RX ORDER — METOPROLOL TARTRATE 25 MG/1
25 TABLET, FILM COATED ORAL 2 TIMES DAILY
Status: DISCONTINUED | OUTPATIENT
Start: 2023-02-21 | End: 2023-02-24 | Stop reason: HOSPADM

## 2023-02-21 RX ADMIN — METOPROLOL TARTRATE 25 MG: 25 TABLET, FILM COATED ORAL at 08:02

## 2023-02-21 RX ADMIN — HYDROCODONE BITARTRATE AND ACETAMINOPHEN 1 TABLET: 5; 325 TABLET ORAL at 10:02

## 2023-02-21 RX ADMIN — CEFEPIME 2 G: 2 INJECTION, POWDER, FOR SOLUTION INTRAVENOUS at 01:02

## 2023-02-21 RX ADMIN — HYDROCODONE BITARTRATE AND ACETAMINOPHEN 1 TABLET: 5; 325 TABLET ORAL at 06:02

## 2023-02-21 RX ADMIN — DIPHENHYDRAMINE HYDROCHLORIDE 25 MG: 25 CAPSULE ORAL at 10:02

## 2023-02-21 RX ADMIN — METOPROLOL TARTRATE 25 MG: 25 TABLET, FILM COATED ORAL at 10:02

## 2023-02-21 RX ADMIN — HYDROCODONE BITARTRATE AND ACETAMINOPHEN 1 TABLET: 5; 325 TABLET ORAL at 05:02

## 2023-02-21 RX ADMIN — PANTOPRAZOLE SODIUM 40 MG: 40 TABLET, DELAYED RELEASE ORAL at 10:02

## 2023-02-21 RX ADMIN — DIPHENHYDRAMINE HYDROCHLORIDE 25 MG: 25 CAPSULE ORAL at 05:02

## 2023-02-21 RX ADMIN — DIPHENHYDRAMINE HYDROCHLORIDE 25 MG: 25 CAPSULE ORAL at 01:02

## 2023-02-21 RX ADMIN — FOLIC ACID 1 MG: 1 TABLET ORAL at 10:02

## 2023-02-21 RX ADMIN — HYDROCODONE BITARTRATE AND ACETAMINOPHEN 1 TABLET: 5; 325 TABLET ORAL at 09:02

## 2023-02-21 RX ADMIN — CEFEPIME 2 G: 2 INJECTION, POWDER, FOR SOLUTION INTRAVENOUS at 02:02

## 2023-02-21 RX ADMIN — SPIRONOLACTONE 100 MG: 25 TABLET ORAL at 10:02

## 2023-02-21 RX ADMIN — SODIUM CHLORIDE: 9 INJECTION, SOLUTION INTRAVENOUS at 10:02

## 2023-02-21 RX ADMIN — DILTIAZEM HYDROCHLORIDE 120 MG: 120 CAPSULE, COATED, EXTENDED RELEASE ORAL at 10:02

## 2023-02-21 NOTE — NURSING
Nurses Note -- 4 Eyes      2/20/2023   11:45 PM      Skin assessed during: Admit      [x] No Pressure Injuries Present    []Prevention Measures Documented      [] Yes- Altered Skin Integrity Present or Discovered   [] LDA Added if Not in Epic (Describe Wound)   [] New Altered Skin Integrity was Present on Admit and Documented in LDA   [] Wound Image Taken    Wound Care Consulted? No    Attending Nurse:  Wai Unger RN     Second RN/Staff Member:  Gayle Blue CNA

## 2023-02-21 NOTE — PROGRESS NOTES
"Gastroenterology Progress Note    Subjective:   Patient reports pruritis that resolves with Benadryl. Still with some abdominal discomfort, but no new pains. Afebrile. WBC improving. Family is at bedside.      Objective:    ROS:    ROS      Vital Signs:  BP (!) 144/72   Pulse 89   Temp 97.8 °F (36.6 °C) (Oral)   Resp 18   Ht 5' 2.99" (1.6 m)   Wt 63.5 kg (140 lb)   SpO2 98%   BMI 24.81 kg/m²   Body mass index is 24.81 kg/m².    Physical Exam:    Physical Exam  Constitutional:       General: She is not in acute distress.  Eyes:      General: Scleral icterus present.   Cardiovascular:      Rate and Rhythm: Normal rate and regular rhythm.   Pulmonary:      Effort: Pulmonary effort is normal. No respiratory distress.   Abdominal:      General: Bowel sounds are normal.      Palpations: Abdomen is soft.      Tenderness: There is no abdominal tenderness.   Skin:     General: Skin is warm and dry.      Coloration: Skin is jaundiced.   Neurological:      Mental Status: She is alert.       Labs:  Recent Results (from the past 48 hour(s))   APTT    Collection Time: 02/20/23  6:29 AM   Result Value Ref Range    PTT 23.3 23.2 - 33.7 seconds   Protime-INR    Collection Time: 02/20/23  6:29 AM   Result Value Ref Range    PT 13.8 12.5 - 14.5 seconds    INR 1.07 0.00 - 1.30   CBC with Differential    Collection Time: 02/20/23  6:29 AM   Result Value Ref Range    WBC 20.5 (H) 4.5 - 11.5 x10(3)/mcL    RBC 3.56 (L) 4.20 - 5.40 x10(6)/mcL    Hgb 11.5 (L) 12.0 - 16.0 g/dL    Hct 31.7 (L) 37.0 - 47.0 %    MCV 89.0 80.0 - 94.0 fL    MCH 32.3 pg    MCHC 36.3 (H) 33.0 - 36.0 g/dL    RDW 17.6 (H) 11.5 - 17.0 %    Platelet 530 (H) 130 - 400 x10(3)/mcL    MPV 12.4 (H) 7.4 - 10.4 fL    Neut % 83.5 %    Lymph % 5.9 %    Mono % 7.4 %    Eos % 0.0 %    Basophil % 0.2 %    Lymph # 1.21 0.6 - 4.6 x10(3)/mcL    Neut # 17.13 (H) 2.1 - 9.2 x10(3)/mcL    Mono # 1.51 (H) 0.1 - 1.3 x10(3)/mcL    Eos # 0.01 0 - 0.9 x10(3)/mcL    Baso # 0.04 0 - 0.2 " x10(3)/mcL    IG# 0.62 (H) 0 - 0.04 x10(3)/mcL    IG% 3.0 %    NRBC% 0.0 %   Bilirubin, Direct    Collection Time: 02/20/23  6:29 AM   Result Value Ref Range    Bilirubin Direct 9.8 (H) 0.0 - <0.5 mg/dL   Comprehensive metabolic panel    Collection Time: 02/20/23  7:51 AM   Result Value Ref Range    Sodium Level 131 (L) 136 - 145 mmol/L    Potassium Level 3.8 3.5 - 5.1 mmol/L    Chloride 98 98 - 107 mmol/L    Carbon Dioxide 23 23 - 31 mmol/L    Glucose Level 87 82 - 115 mg/dL    Blood Urea Nitrogen 30.8 (H) 9.8 - 20.1 mg/dL    Creatinine 0.99 0.55 - 1.02 mg/dL    Calcium Level Total 8.0 (L) 8.4 - 10.2 mg/dL    Protein Total 5.1 (L) 5.8 - 7.6 gm/dL    Albumin Level 2.3 (L) 3.4 - 4.8 g/dL    Globulin 2.8 2.4 - 3.5 gm/dL    Albumin/Globulin Ratio 0.8 (L) 1.1 - 2.0 ratio    Bilirubin Total 18.4 (H) <=1.5 mg/dL    Alkaline Phosphatase 267 (H) 40 - 150 unit/L    Alanine Aminotransferase 149 (H) 0 - 55 unit/L    Aspartate Aminotransferase 94 (H) 5 - 34 unit/L    eGFR >60 mls/min/1.73/m2   Lipase    Collection Time: 02/20/23  7:51 AM   Result Value Ref Range    Lipase Level 16 <=60 U/L   Troponin I    Collection Time: 02/20/23  7:51 AM   Result Value Ref Range    Troponin-I <0.010 0.000 - 0.045 ng/mL   Urinalysis, Reflex to Urine Culture    Collection Time: 02/20/23 11:12 AM    Specimen: Urine   Result Value Ref Range    Color, UA Dark Yellow Yellow, Light-Yellow, Dark Yellow, Nicolle, Straw    Appearance, UA Clear Clear    Specific Gravity, UA >=1.040 (H) 1.001 - 1.030    pH, UA 7.0 5.0 - 8.5    Protein, UA Trace (A) Negative mg/dL    Glucose, UA Trace (A) Negative, Normal mg/dL    Ketones, UA Negative Negative mg/dL    Blood, UA Negative Negative unit/L    Bilirubin, UA 2+ (A) Negative mg/dL    Urobilinogen, UA 1.0 0.2, 1.0, Normal mg/dL    Nitrites, UA Negative Negative    Leukocyte Esterase, UA Trace (A) Negative unit/L   Urinalysis, Microscopic    Collection Time: 02/20/23 11:12 AM   Result Value Ref Range    RBC, UA <5  <=5 /HPF    WBC, UA <5 <=5 /HPF    Squamous Epithelial Cells, UA <5 <=5 /HPF    Bacteria, UA None Seen None Seen, Rare, Occasional /HPF   Brain natriuretic peptide    Collection Time: 02/20/23 12:37 PM   Result Value Ref Range    Natriuretic Peptide 70.1 <=100.0 pg/mL   CBC with Differential    Collection Time: 02/21/23  3:13 AM   Result Value Ref Range    WBC 14.3 (H) 4.5 - 11.5 x10(3)/mcL    RBC 3.25 (L) 4.20 - 5.40 x10(6)/mcL    Hgb 10.4 (L) 12.0 - 16.0 g/dL    Hct 29.5 (L) 37.0 - 47.0 %    MCV 90.8 80.0 - 94.0 fL    MCH 32.0 pg    MCHC 35.3 33.0 - 36.0 g/dL    RDW 17.4 (H) 11.5 - 17.0 %    Platelet 415 (H) 130 - 400 x10(3)/mcL    MPV 11.3 (H) 7.4 - 10.4 fL    Neut % 82.5 %    Lymph % 5.6 %    Mono % 7.9 %    Eos % 0.3 %    Basophil % 0.2 %    Lymph # 0.80 0.6 - 4.6 x10(3)/mcL    Neut # 11.84 (H) 2.1 - 9.2 x10(3)/mcL    Mono # 1.13 0.1 - 1.3 x10(3)/mcL    Eos # 0.04 0 - 0.9 x10(3)/mcL    Baso # 0.03 0 - 0.2 x10(3)/mcL    IG# 0.50 (H) 0 - 0.04 x10(3)/mcL    IG% 3.5 %    NRBC% 0.0 %   Comprehensive Metabolic Panel (CMP)    Collection Time: 02/21/23  3:17 AM   Result Value Ref Range    Sodium Level 130 (L) 136 - 145 mmol/L    Potassium Level 3.9 3.5 - 5.1 mmol/L    Chloride 98 98 - 107 mmol/L    Carbon Dioxide 23 23 - 31 mmol/L    Glucose Level 97 82 - 115 mg/dL    Blood Urea Nitrogen 24.2 (H) 9.8 - 20.1 mg/dL    Creatinine 0.80 0.55 - 1.02 mg/dL    Calcium Level Total 7.6 (L) 8.4 - 10.2 mg/dL    Protein Total 4.3 (L) 5.8 - 7.6 gm/dL    Albumin Level 2.0 (L) 3.4 - 4.8 g/dL    Globulin 2.3 (L) 2.4 - 3.5 gm/dL    Albumin/Globulin Ratio 0.9 (L) 1.1 - 2.0 ratio    Bilirubin Total 13.5 (H) <=1.5 mg/dL    Alkaline Phosphatase 220 (H) 40 - 150 unit/L    Alanine Aminotransferase 112 (H) 0 - 55 unit/L    Aspartate Aminotransferase 62 (H) 5 - 34 unit/L    eGFR >60 mls/min/1.73/m2       Imaging:    No new radiological images to review.      Assessment/Plan:  1. Right upper quadrant abdominal pain    2. Jaundice    3. Elevated  bilirubin    4. SOB (shortness of breath)    5. Pancreatic mass    6. Atrial fibrillation, unspecified type       73 yo F known to Dr. Jak Gonzalez.  Patient with a PMH of HTN and RA previously on methotrexate and Plaquenil.  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  ERCP 2/16/23 with placement of plastic stents in CBD and PD.  CBD brushings: ductal adenocarcinoma.  Here with abdominal pain.  Found to have new leukocytosis, improving LFTs, and CT with fluid collection about the pancreas.      Adenocarcinoma of the pancreas     CT abdomen/pelvis with IV contrast noted distended gallbladder, sludge, CBD stent in place, trace pneumobilia, no intrahepatic biliary ductal dilation, pancreas atrophy, questionable subtle hypodense lesion at the region of the ampulla the pancreatic head measuring 1.8 cm, stent in the region of the uncinate process extending beyond the expected contour of the pancreas, mixed density collection in the region of the tip of the stent which extends to encase the SMA in about the celiac measuring 4.5 cm, no pancreatic ductal dilation, moderate stool burden, no bowel obstruction.    Lipase normal.  - Trend LFTs  - Will start bile binding agent for pruritis secondary to obstructive jaundice  - Will consult oncology. Cytology from biliary brushing are available: ductal adenocarcinoma  - Diet as tolerated today  - Keep NPO after midnight  - ERCP + stent exchange tomorrow       Jak Woods PA-C

## 2023-02-21 NOTE — CONSULTS
[facility] - Oncology Acute  Hematology/Oncology  Consult Note    Patient Name: Abby Prabhakar  MRN: [id number]  Admission Date: 2/20/2023  Hospital Length of Stay: 1 days  Attending Provider: Chidi Reynolds MD  Consulting Provider: Doron May MD  Principal Problem:<principal problem not specified>    Inpatient consult to Oncology  Consult performed by: Doron May MD  Consult ordered by: BESSIE Millan      Subjective:     HPI:  This is a 72-year-old female with extensive medical history.  A rheumatoid arthritis on immunosuppressant medications who started having increasing weight loss and then jaundice.  Was seen by her primary care underwent an ultrasound as her bilirubin was 17.  Showed intrahepatic biliary dilatation, she was seen by GI underwent workup which includes an MRI.  The MRI was consistent with a peripancreatic malignancy ill-defined, and a complex cystic adnexal structure.  She then underwent an ERCP and biliary stent, pathology of which showed a adenocarcinoma.  She continued to have increasing constipation abdominal pain feeling unwell and came to the emergency room and was admitted on February 20th.      Oncology History Overview Note   Patient did have acute onset of jaundice with a bilirubin of 17 on January 30, 2023.  Acute hepatitis panel was negative, TIFFANIE was negative, smooth muscle antibody was negative, ceruloplasmin was normal, HIV was negative,  By February 7th her bilirubin edvin to 24.6.  And on February 16th she underwent a ERCP and biliary stent.  She then presented back to the emergency room on February 20th with constipation and nausea.     Pancreatic cancer   2/14/2023 Imaging Significant Findings    02/14/2023, MRI of the abdomen normal MRCP,  There is an ill-defined area of intrinsic hypointense T1 signal approximate 2.3 x 1.6 cm.  Is inseparable from the adjacent duodenal wall and ampulla, there is a normal fat signal surrounding the SMA and  celiac artery, no peripancreatic edema or fluid collection.  The pancreatic body and tail appears slightly atrophic with a normal signal and contour, the main pancreatic duct appears borderline measuring 4 mm within the head and the body measuring just over 2 mm within the tail.  No local pancreatic ductal filling defects, there is pronounced intrahepatic and extrahepatic biliary dilatation, with the common bile duct measures 14 mm, the common hepatic duct 15 mm, with a smooth tapering within the liver extending to the periphery    Impression severe intrahepatic  biliary dilatation, no gallstone, ill-defined area of abnormal signal within the head of the pancreas near the ampulla measuring 2.3 cm concerning for primary periampullary carcinoma, no lymphadenopathy, cystic adnexal lesion     2/16/2023 Initial Diagnosis    Pancreatic cancer versus bile duct carcinoma     2/16/2023 Imaging Significant Findings    Impression:            - A biliary tract obstruction secondary to what                          appeared to be a mass was found in the lower third                          of the main duct.                          - A biliary sphincterotomy was performed.                          - One plastic stent was placed into the common                          bile duct.                          - One plastic stent was placed into the ventral                          pancreatic duct.      2/16/2023 Pathology Significant Finding        FINAL DIAGNOSIS       COMMON BILE DUCT BRUSH (ONE CYTOLOGIC SMEAR AND THIN PREP CYTOLOGY):       SPARSE AGGREGATES OF ATYPICAL EPITHELIAL CELLS CONSISTENT WITH DUCTAL   ADENOCARCINOMA.      2/20/2023 Imaging Significant Findings    CT abdomen pelvis:  Coronary calcifications, small right pleural effusion, gallbladder distended, sludge, common bile duct stent, no intrahepatic biliary dilatation, atrophy pancreas,  Subtle hypodense lesion region of the ampulla measuring 1.8 cm, stent in the  region of the uncinate process, mixed density collection in the region the tip of the stent, bilateral adnexal cysts,          Medications:  Continuous Infusions:   sodium chloride 0.9% 75 mL/hr at 23 1007     Scheduled Meds:   ceFEPime (MAXIPIME) IVPB  2 g Intravenous Q12H    colestipoL  2 g Oral Daily    diltiaZEM  120 mg Oral Daily    folic acid  1 mg Oral Daily    metoprolol tartrate  25 mg Oral BID    pantoprazole  40 mg Oral Daily    spironolactone  100 mg Oral Daily     PRN Meds:acetaminophen, acetaminophen, dextrose 10%, dextrose 10%, diphenhydrAMINE, glucagon (human recombinant), glucose, glucose, HYDROcodone-acetaminophen, ondansetron, traMADoL     Review of patient's allergies indicates:   Allergen Reactions    Atorvastatin     Penicillins         Past Medical History:   Diagnosis Date    Hypertension     Rheumatoid arthritis, unspecified      Past Surgical History:   Procedure Laterality Date     SECTION      ERCP N/A 2023    Procedure: ERCP;  Surgeon: Barbra Schrader III, MD;  Location: [facility] ENDOSCOPY;  Service: Gastroenterology;  Laterality: N/A;    ERCP W/ SPHICTEROTOMY  2023    Procedure: ERCP, WITH SPHINCTEROTOMY;  Surgeon: Barbra Schrader III, MD;  Location: [facility] ENDOSCOPY;  Service: Gastroenterology;;    ERCP, WITH STENT INSERTION  2023    Procedure: ERCP, WITH STENT INSERTION;  Surgeon: Barbra Schrader III, MD;  Location: [facility] ENDOSCOPY;  Service: Gastroenterology;;    HYSTERECTOMY       Family History    None       Tobacco Use    Smoking status: Never    Smokeless tobacco: Never   Substance and Sexual Activity    Alcohol use: Not Currently     Comment: Lamar 1/2 pt daily    Drug use: Never    Sexual activity: Not on file       Review of Systems   Constitutional:  Positive for appetite change, fatigue and unexpected weight change. Negative for activity change.   Eyes: Negative.    Respiratory:  Positive for cough and shortness of breath.    Cardiovascular:  Negative.    Gastrointestinal:  Positive for abdominal distention, abdominal pain, constipation and diarrhea. Negative for blood in stool.   Endocrine: Negative.    Genitourinary: Negative.    Skin:  Positive for rash.   Allergic/Immunologic: Negative.    Neurological: Negative.    Hematological: Negative.    Psychiatric/Behavioral: Negative.     Objective:     Vital Signs (Most Recent):  Temp: 97.7 °F (36.5 °C) (02/21/23 1532)  Pulse: 60 (02/21/23 1532)  Resp: 18 (02/21/23 1532)  BP: 128/73 (02/21/23 1532)  SpO2: 99 % (02/21/23 1532) Vital Signs (24h Range):  Temp:  [97.7 °F (36.5 °C)-98.2 °F (36.8 °C)] 97.7 °F (36.5 °C)  Pulse:  [] 60  Resp:  [13-19] 18  SpO2:  [97 %-100 %] 99 %  BP: (110-145)/(72-89) 128/73     Weight: 63.5 kg (140 lb)  Body mass index is 24.81 kg/m².  Body surface area is 1.68 meters squared.      Intake/Output Summary (Last 24 hours) at 2/21/2023 1542  Last data filed at 2/21/2023 1455  Gross per 24 hour   Intake 960 ml   Output --   Net 960 ml       Physical Exam  Vitals reviewed.   Constitutional:       General: She is awake. She is not in acute distress.     Appearance: Normal appearance. She is normal weight.      Comments: Jaundice   HENT:      Head: Normocephalic and atraumatic.      Right Ear: Tympanic membrane normal.      Left Ear: Tympanic membrane normal.      Mouth/Throat:      Mouth: Mucous membranes are moist.      Pharynx: Oropharynx is clear.   Eyes:      Extraocular Movements: Extraocular movements intact.      Pupils: Pupils are equal, round, and reactive to light.      Comments: Jaundice   Cardiovascular:      Rate and Rhythm: Normal rate and regular rhythm.      Pulses: Normal pulses.      Heart sounds: Normal heart sounds.   Pulmonary:      Effort: Pulmonary effort is normal.      Breath sounds: Normal breath sounds and air entry.   Abdominal:      General: Abdomen is flat. Bowel sounds are normal.      Palpations: Abdomen is soft.      Tenderness: There is no  abdominal tenderness.   Musculoskeletal:      Cervical back: Normal range of motion.      Right lower leg: No edema.      Left lower leg: No edema.   Lymphadenopathy:      Cervical: No cervical adenopathy.      Upper Body:      Right upper body: No axillary adenopathy.      Left upper body: No axillary adenopathy.      Lower Body: No right inguinal adenopathy. No left inguinal adenopathy.   Skin:     General: Skin is warm and dry.      Coloration: Skin is jaundiced.   Neurological:      General: No focal deficit present.      Mental Status: She is alert and oriented to person, place, and time. Mental status is at baseline.      GCS: GCS eye subscore is 4. GCS verbal subscore is 5. GCS motor subscore is 6.      Cranial Nerves: Cranial nerves 2-12 are intact.   Psychiatric:         Attention and Perception: Attention normal.         Mood and Affect: Mood and affect normal.         Speech: Speech normal.         Behavior: Behavior is cooperative.         Thought Content: Thought content normal.         Cognition and Memory: Cognition normal.         Judgment: Judgment normal.       Significant Labs:   CBC:   Recent Labs   Lab 02/20/23  0629 02/21/23  0313   WBC 20.5* 14.3*   HGB 11.5* 10.4*   HCT 31.7* 29.5*   * 415*   , CMP:   Recent Labs   Lab 02/20/23  0751 02/21/23  0317   * 130*   K 3.8 3.9   CO2 23 23   BUN 30.8* 24.2*   CREATININE 0.99 0.80   CALCIUM 8.0* 7.6*   ALBUMIN 2.3* 2.0*   BILITOT 18.4* 13.5*   ALKPHOS 267* 220*   AST 94* 62*   * 112*       Diagnostic Results:  I have reviewed all pertinent imaging results/findings within the past 24 hours.    Assessment/Plan:     Active Hospital Problems    Diagnosis    Pancreatic cancer     This is a 72-year-old female History of hypertension, AFib, rheumatoid arthritis, h/o ETOH use with recent onset of jaundice, biliary obstruction status post recent ERCP and sent with biliary brushings consistent with adenocarcinoma     Appears to be a T2 N0 M0  adenocarcinoma of the pancreas to biliary duct with obstructive jaundice.  Versus  cholangiocarcinoma      Recs  Consider celiac plexus nerve block with repeat Stent   And EUS    B complex vitamin with risk of DTs   Will eventually need outpatient PET scan   No acute intervention from Oncology until her obstructive jaundice is resolved, and no evidence of secondary infection, will need outpatient follow-up    On follow-up will need CBC, CMP, CEA, CA 19 9   Will need follow-up of the EUS and ERCP from today.    Will eventually need outpatient PET imaging, and follow-up.  Patient the family are contemplating going to [place] as the rest of the family lives near the [place].    Discussed the goals of 1st relieving the obstructive jaundice, follow-up other staging images , then discussion chemotherapy or surgery.      Thank you for your consult.     If patient wants to go to [facility] in the [place] now has a good time for her to go after this hospitalization.    Doron May MD  Hematology/Oncology  [facility] - Oncology [facility]

## 2023-02-21 NOTE — CONSULTS
Inpatient consult to Cardiology  Consult performed by: TONY Ray  Consult ordered by: Morgan A Eason, PA-C [facility]  Cardiology  Consult Note    Patient Name: Abby Prabhakar  MRN: [id number]  Admission Date: 2023  Hospital Length of Stay: 1 days  Code Status: Partial Code   Attending Provider: Chidi Reynolds MD   Consulting Provider: TONY Ray  Primary Care Physician: Juan Manuel Payne MD  Principal Problem:<principal problem not specified>    Patient information was obtained from patient, past medical records, and ER records.     Subjective:     Chief Complaint: Reason for Consult: PAF    HPI: Ms. Prabhakar is a 73 y/o female who is unknown to [facility]. The patient presented to [facility] on 23 with c/o Abd Pain and Constipation. Apparently, 4 days prior to presentation she underwent an ERCP with Sphincterotomy and Stent Placement for Pancreatic Mass/Cancer. She developed abdominal pain and reported no bowel movements for 5+ days. Initial workup in the ER revealed a CT of the Abd/Pelvis with Large Stool Commerce but no Bowel Obstruction and evidence of previous pancreatic stenting/procedure. She did have an enemas in the ER with a subsequent bowel movement. She was admitted to Hospital Medicine and GI was consulted. She did have an irregular rhythm on telemetry and for this reason [facility] has been consulted. It is important to note that the PT has never been diagnosed previously with PAF/Atrial Fibrillation/Flutter.     PMH: HTN, RA, Pancreatic Cancer (Ductal Adenocarcinoma)  PSH: , ERCP with Sphincterotomy and Stent Insertion, LEONIDES   Family History: Reviewed and Unremarkable for Heart Disease  Social History: Denies Illicit Drug and Tobacco Use; + ETOH Use; 1/2 PT of Vodka Daily    Previous Cardiac Diagnostics:   ECHO 23:  The left ventricle is normal in size with normal systolic function. LVEF 55-60%.  LV global longitudinal strain measures -17.9%.  Normal left  ventricular diastolic function.  Normal right ventricular size with normal right ventricular systolic function.  Mild mitral regurgitation.  Mild to moderate tricuspid regurgitation.  Mild pulmonic regurgitation.  The estimated PA systolic pressure is 22 mmHg.    Review of patient's allergies indicates:   Allergen Reactions    Atorvastatin     Penicillins      No current facility-administered medications on file prior to encounter.     Current Outpatient Medications on File Prior to Encounter   Medication Sig    ALPRAZolam (XANAX) 0.5 MG tablet Take 0.5 mg by mouth 3 (three) times daily.    diltiaZEM (DILACOR XR) 120 MG CDCR Take 120 mg by mouth once daily.    folic acid (FOLVITE) 1 MG tablet Take 1 mg by mouth once daily.    furosemide (LASIX) 40 MG tablet Take 40 mg by mouth 2 (two) times daily.    meloxicam (MOBIC) 7.5 MG tablet Take 7.5 mg by mouth once daily.    spironolactone (ALDACTONE) 100 MG tablet Take 100 mg by mouth once daily.    colestipoL (COLESTID) 5 gram granules Take 5 g by mouth 2 (two) times daily.    mupirocin (BACTROBAN) 2 % ointment Apply topically 3 (three) times daily.    pantoprazole (PROTONIX) 40 MG tablet Take 40 mg by mouth once daily.     Review of Systems   Constitutional:  Positive for fatigue.   Respiratory:  Negative for chest tightness and shortness of breath.    Cardiovascular:  Positive for palpitations. Negative for chest pain and leg swelling.   Gastrointestinal:  Negative for abdominal pain.   All other systems reviewed and are negative.    Objective:     Vital Signs (Most Recent):  Temp: 98.2 °F (36.8 °C) (02/21/23 0006)  Pulse: 71 (02/21/23 0006)  Resp: 18 (02/21/23 0632)  BP: 110/72 (02/21/23 0006)  SpO2: 97 % (02/21/23 0006) Vital Signs (24h Range):  Temp:  [97.7 °F (36.5 °C)-98.2 °F (36.8 °C)] 98.2 °F (36.8 °C)  Pulse:  [] 71  Resp:  [13-19] 18  SpO2:  [95 %-100 %] 97 %  BP: (110-145)/(72-98) 110/72     Weight: 63.5 kg (140 lb)  Body mass index is 24.81  kg/m².    SpO2: 97 %       No intake or output data in the 24 hours ending 02/21/23 0754    Lines/Drains/Airways       Peripheral Intravenous Line  Duration                  Peripheral IV - Single Lumen 20 G Left;Posterior Hand -- days                  Significant Labs:  Recent Results (from the past 72 hour(s))   APTT    Collection Time: 02/20/23  6:29 AM   Result Value Ref Range    PTT 23.3 23.2 - 33.7 seconds   Protime-INR    Collection Time: 02/20/23  6:29 AM   Result Value Ref Range    PT 13.8 12.5 - 14.5 seconds    INR 1.07 0.00 - 1.30   CBC with Differential    Collection Time: 02/20/23  6:29 AM   Result Value Ref Range    WBC 20.5 (H) 4.5 - 11.5 x10(3)/mcL    RBC 3.56 (L) 4.20 - 5.40 x10(6)/mcL    Hgb 11.5 (L) 12.0 - 16.0 g/dL    Hct 31.7 (L) 37.0 - 47.0 %    MCV 89.0 80.0 - 94.0 fL    MCH 32.3 pg    MCHC 36.3 (H) 33.0 - 36.0 g/dL    RDW 17.6 (H) 11.5 - 17.0 %    Platelet 530 (H) 130 - 400 x10(3)/mcL    MPV 12.4 (H) 7.4 - 10.4 fL    Neut % 83.5 %    Lymph % 5.9 %    Mono % 7.4 %    Eos % 0.0 %    Basophil % 0.2 %    Lymph # 1.21 0.6 - 4.6 x10(3)/mcL    Neut # 17.13 (H) 2.1 - 9.2 x10(3)/mcL    Mono # 1.51 (H) 0.1 - 1.3 x10(3)/mcL    Eos # 0.01 0 - 0.9 x10(3)/mcL    Baso # 0.04 0 - 0.2 x10(3)/mcL    IG# 0.62 (H) 0 - 0.04 x10(3)/mcL    IG% 3.0 %    NRBC% 0.0 %   Bilirubin, Direct    Collection Time: 02/20/23  6:29 AM   Result Value Ref Range    Bilirubin Direct 9.8 (H) 0.0 - <0.5 mg/dL   Comprehensive metabolic panel    Collection Time: 02/20/23  7:51 AM   Result Value Ref Range    Sodium Level 131 (L) 136 - 145 mmol/L    Potassium Level 3.8 3.5 - 5.1 mmol/L    Chloride 98 98 - 107 mmol/L    Carbon Dioxide 23 23 - 31 mmol/L    Glucose Level 87 82 - 115 mg/dL    Blood Urea Nitrogen 30.8 (H) 9.8 - 20.1 mg/dL    Creatinine 0.99 0.55 - 1.02 mg/dL    Calcium Level Total 8.0 (L) 8.4 - 10.2 mg/dL    Protein Total 5.1 (L) 5.8 - 7.6 gm/dL    Albumin Level 2.3 (L) 3.4 - 4.8 g/dL    Globulin 2.8 2.4 - 3.5 gm/dL     Albumin/Globulin Ratio 0.8 (L) 1.1 - 2.0 ratio    Bilirubin Total 18.4 (H) <=1.5 mg/dL    Alkaline Phosphatase 267 (H) 40 - 150 unit/L    Alanine Aminotransferase 149 (H) 0 - 55 unit/L    Aspartate Aminotransferase 94 (H) 5 - 34 unit/L    eGFR >60 mls/min/1.73/m2   Lipase    Collection Time: 02/20/23  7:51 AM   Result Value Ref Range    Lipase Level 16 <=60 U/L   Troponin I    Collection Time: 02/20/23  7:51 AM   Result Value Ref Range    Troponin-I <0.010 0.000 - 0.045 ng/mL   Urinalysis, Reflex to Urine Culture    Collection Time: 02/20/23 11:12 AM    Specimen: Urine   Result Value Ref Range    Color, UA Dark Yellow Yellow, Light-Yellow, Dark Yellow, Nicolle, Straw    Appearance, UA Clear Clear    Specific Gravity, UA >=1.040 (H) 1.001 - 1.030    pH, UA 7.0 5.0 - 8.5    Protein, UA Trace (A) Negative mg/dL    Glucose, UA Trace (A) Negative, Normal mg/dL    Ketones, UA Negative Negative mg/dL    Blood, UA Negative Negative unit/L    Bilirubin, UA 2+ (A) Negative mg/dL    Urobilinogen, UA 1.0 0.2, 1.0, Normal mg/dL    Nitrites, UA Negative Negative    Leukocyte Esterase, UA Trace (A) Negative unit/L   Urinalysis, Microscopic    Collection Time: 02/20/23 11:12 AM   Result Value Ref Range    RBC, UA <5 <=5 /HPF    WBC, UA <5 <=5 /HPF    Squamous Epithelial Cells, UA <5 <=5 /HPF    Bacteria, UA None Seen None Seen, Rare, Occasional /HPF   Brain natriuretic peptide    Collection Time: 02/20/23 12:37 PM   Result Value Ref Range    Natriuretic Peptide 70.1 <=100.0 pg/mL   CBC with Differential    Collection Time: 02/21/23  3:13 AM   Result Value Ref Range    WBC 14.3 (H) 4.5 - 11.5 x10(3)/mcL    RBC 3.25 (L) 4.20 - 5.40 x10(6)/mcL    Hgb 10.4 (L) 12.0 - 16.0 g/dL    Hct 29.5 (L) 37.0 - 47.0 %    MCV 90.8 80.0 - 94.0 fL    MCH 32.0 pg    MCHC 35.3 33.0 - 36.0 g/dL    RDW 17.4 (H) 11.5 - 17.0 %    Platelet 415 (H) 130 - 400 x10(3)/mcL    MPV 11.3 (H) 7.4 - 10.4 fL    Neut % 82.5 %    Lymph % 5.6 %    Mono % 7.9 %    Eos % 0.3  %    Basophil % 0.2 %    Lymph # 0.80 0.6 - 4.6 x10(3)/mcL    Neut # 11.84 (H) 2.1 - 9.2 x10(3)/mcL    Mono # 1.13 0.1 - 1.3 x10(3)/mcL    Eos # 0.04 0 - 0.9 x10(3)/mcL    Baso # 0.03 0 - 0.2 x10(3)/mcL    IG# 0.50 (H) 0 - 0.04 x10(3)/mcL    IG% 3.5 %    NRBC% 0.0 %   Comprehensive Metabolic Panel (CMP)    Collection Time: 02/21/23  3:17 AM   Result Value Ref Range    Sodium Level 130 (L) 136 - 145 mmol/L    Potassium Level 3.9 3.5 - 5.1 mmol/L    Chloride 98 98 - 107 mmol/L    Carbon Dioxide 23 23 - 31 mmol/L    Glucose Level 97 82 - 115 mg/dL    Blood Urea Nitrogen 24.2 (H) 9.8 - 20.1 mg/dL    Creatinine 0.80 0.55 - 1.02 mg/dL    Calcium Level Total 7.6 (L) 8.4 - 10.2 mg/dL    Protein Total 4.3 (L) 5.8 - 7.6 gm/dL    Albumin Level 2.0 (L) 3.4 - 4.8 g/dL    Globulin 2.3 (L) 2.4 - 3.5 gm/dL    Albumin/Globulin Ratio 0.9 (L) 1.1 - 2.0 ratio    Bilirubin Total 13.5 (H) <=1.5 mg/dL    Alkaline Phosphatase 220 (H) 40 - 150 unit/L    Alanine Aminotransferase 112 (H) 0 - 55 unit/L    Aspartate Aminotransferase 62 (H) 5 - 34 unit/L    eGFR >60 mls/min/1.73/m2     Significant Imaging:  Imaging Results              X-Ray Chest AP Portable (Final result)  Result time 02/20/23 12:40:36      Final result by Bethany Gandhi MD (02/20/23 12:40:36)                   Impression:      Trace right pleural effusion.      Electronically signed by: Bethany Gandhi  Date:    02/20/2023  Time:    12:40               Narrative:    EXAMINATION:  XR CHEST AP PORTABLE    CLINICAL HISTORY:  Shortness of breath    TECHNIQUE:  Single frontal view of the chest was performed.    COMPARISON:  11/07/2016    FINDINGS:  LINES AND TUBES: EKG/telemetry leads overlie the chest.    MEDIASTINUM AND SELENE: Cardiac silhouette is at the upper limits of normal.    LUNGS: No lobar consolidation. No edema.    PLEURA:Trace right pleural fluid.No pneumothorax.    BONES: No acute osseous abnormality.                                       CT Abdomen Pelvis With  Contrast (Final result)  Result time 02/20/23 10:58:43      Final result by Bethany Gandhi MD (02/20/23 10:58:43)                   Impression:      1. No evidence of bowel obstruction.  Moderate stool burden.  2. Recent ERCP with placement of biliary and pancreatic stents.  There is expected pneumobilia.  The gallbladder is moderately distended.  The pancreatic stent extends beyond the expected contour of the margin of the uncinate process.  Tip of the pancreatic stent extends into a mixed density peripancreatic collection extending to encase the SMA and abut the celiac.  No pancreatic or biliary ductal dilatation.  Questionable subtle hypodense mass at the head of the pancreas.  Defer to recent ERCP and pathology results.  3. Free intraperitoneal fluid  4. Bilateral complex cystic adnexal masses      Electronically signed by: Bethany Gandhi  Date:    02/20/2023  Time:    10:58               Narrative:    EXAMINATION:  CT ABDOMEN PELVIS WITH CONTRAST    CLINICAL HISTORY:  Abdominal abscess/infection suspected;Abdominal pain, acute, nonlocalized;Hx of Pancreatic mass, recent stent placed, abd distention, constipation x5 days, SBO?;    TECHNIQUE:  Helically acquired images with axial, sagittal and coronal reformations were obtained from the lung bases to the pubic symphysis after the IV administration of contrast.    Automated tube current modulation, weight-based exposure dosing, and/or iterative reconstruction technique utilized to reach lowest reasonably achievable exposure rate.    DLP: 505 mGy*cm    COMPARISON:  No relevant prior available for comparison at the time of dictation.    FINDINGS:  HEART: There are coronary artery calcifications.    LUNG BASES: Small right pleural effusion.    LIVER: Irregular surface contour of the liver.    BILIARY: The gallbladder is distended.  There is sludge.  Tiny focus of gas within the bladder lumen compatible with recent instrumentation.  There is a common bile duct  stent in place.  Trace pneumobilia.  No intrahepatic biliary ductal dilatation.    PANCREAS: The pancreas atrophied.  Questionable subtle hypodense lesion at the region of the ampulla at the head of the pancreas, measuring approximately 1.8 cm.  There is a stent in the region of the uncinate process extending beyond the expected contour of the pancreas.  There is a mixed density collection in the region of the tip of the stent which extends to encase the SMA and about the celiac and measures approximately 4.5 cm.  No pancreatic ductal dilatation..    SPLEEN: Normal in size    ADRENALS: No mass.    KIDNEYS/URETERS: The kidneys enhance symmetrically.  No hydronephrosis.    GI TRACT/MESENTERY: The bowel is normal in caliber without evidence of obstruction.  Moderate stool burden.  The appendix is not confidently visualized.    PERITONEUM: There is small volume free intraperitoneal fluid within the abdomen and pelvis.No free air.    LYMPH NODES: No enlarged lymph nodes by size criteria.    VASCULATURE: Aortoiliac atherosclerosis.    BLADDER: Normal appearance given degree of distention.    REPRODUCTIVE ORGANS: Postop hysterectomy.  There are bilateral adnexal cysts.  Complex septated cystic lesion at the left adnexa measures 9.4 x 4.8 cm with an internal 4.3 cm component with relative increased attenuation.  Right adnexal cystic mass measures 7 cm.    SOFT TISSUES: Unremarkable.    BONES: Vertebral hemangiomata.  Lumbar spondylosis.                                    EKG:    Results for orders placed or performed during the hospital encounter of 02/20/23   EKG 12-lead    Narrative    Test Reason : R06.02,    Vent. Rate : 095 BPM     Atrial Rate : 000 BPM     P-R Int : 000 ms          QRS Dur : 082 ms      QT Int : 366 ms       P-R-T Axes : 000 019 069 degrees     QTc Int : 459 ms    Normal sinus rhythm  Atrial premature complexes  Abnormal ECG  No previous ECGs available  Confirmed by Paty KUHN, Nico ([id number]) on 2/20/2023  11:04:19 PM    Referred By: SHEYLA   SELF           Confirmed By:Nico Newman MD         Telemetry: SR 80s    Physical Exam  Constitutional:       Appearance: Normal appearance.   HENT:      Head: Normocephalic.      Mouth/Throat:      Mouth: Mucous membranes are moist.   Eyes:      Extraocular Movements: Extraocular movements intact.   Cardiovascular:      Rate and Rhythm: Normal rate and regular rhythm.      Pulses: Normal pulses.   Pulmonary:      Effort: Pulmonary effort is normal.      Breath sounds: Normal breath sounds.   Abdominal:      Palpations: Abdomen is soft.   Musculoskeletal:         General: No swelling. Normal range of motion.   Skin:     General: Skin is warm and dry.      Coloration: Skin is jaundiced.   Neurological:      General: No focal deficit present.      Mental Status: She is alert and oriented to person, place, and time.   Psychiatric:         Mood and Affect: Mood normal.         Behavior: Behavior normal.     Home Medications:   No current facility-administered medications on file prior to encounter.     Current Outpatient Medications on File Prior to Encounter   Medication Sig Dispense Refill    ALPRAZolam (XANAX) 0.5 MG tablet Take 0.5 mg by mouth 3 (three) times daily.      diltiaZEM (DILACOR XR) 120 MG CDCR Take 120 mg by mouth once daily.      folic acid (FOLVITE) 1 MG tablet Take 1 mg by mouth once daily.      furosemide (LASIX) 40 MG tablet Take 40 mg by mouth 2 (two) times daily.      meloxicam (MOBIC) 7.5 MG tablet Take 7.5 mg by mouth once daily.      spironolactone (ALDACTONE) 100 MG tablet Take 100 mg by mouth once daily.      colestipoL (COLESTID) 5 gram granules Take 5 g by mouth 2 (two) times daily.      mupirocin (BACTROBAN) 2 % ointment Apply topically 3 (three) times daily. 30 g 0    pantoprazole (PROTONIX) 40 MG tablet Take 40 mg by mouth once daily.       Current Inpatient Medications:    Current Facility-Administered Medications:     0.9%  NaCl infusion, ,  Intravenous, Continuous, Israel Juan PA-C, Last Rate: 75 mL/hr at 02/20/23 1530, New Bag at 02/20/23 1530    acetaminophen tablet 650 mg, 650 mg, Oral, Q8H PRN, Israel Juan PA-C    acetaminophen tablet 650 mg, 650 mg, Oral, Q4H PRN, Israel Juan PA-C    ceFEPIme (MAXIPIME) 2 g in dextrose 5 % in water (D5W) 5 % 50 mL IVPB (MB+), 2 g, Intravenous, Q12H, Alcides Freeman MD, Stopped at 02/21/23 0158    dextrose 10% bolus 125 mL 125 mL, 12.5 g, Intravenous, PRN, Israel Juan PA-C    dextrose 10% bolus 250 mL 250 mL, 25 g, Intravenous, PRN, Israel Juan PA-C    diltiaZEM 24 hr capsule 120 mg, 120 mg, Oral, Daily, Cisco Ndiaye MD    diphenhydrAMINE capsule 25 mg, 25 mg, Oral, Q6H PRN, Cisco Ndiaye MD, 25 mg at 02/21/23 0130    folic acid tablet 1 mg, 1 mg, Oral, Daily, Cisco Ndiaye MD    glucagon (human recombinant) injection 1 mg, 1 mg, Intramuscular, PRN, Israel Juan PA-C    glucose chewable tablet 16 g, 16 g, Oral, PRN, Israel Juan PA-C    glucose chewable tablet 24 g, 24 g, Oral, PRN, Israel Juan PA-C    HYDROcodone-acetaminophen 5-325 mg per tablet 1 tablet, 1 tablet, Oral, Q4H PRN, Cisco Ndiaye MD, 1 tablet at 02/21/23 0632    ondansetron injection 4 mg, 4 mg, Intravenous, Q8H PRN, Israel Juan PA-C    pantoprazole EC tablet 40 mg, 40 mg, Oral, Daily, Cisco Ndiaye MD    spironolactone tablet 100 mg, 100 mg, Oral, Daily, Cisco Ndiaye MD    traMADoL tablet 50 mg, 50 mg, Oral, Q6H PRN, Cisco Ndiaye MD, 50 mg at 02/20/23 1619    VTE Risk Mitigation (From admission, onward)           Ordered     Place sequential compression device  Until discontinued         02/20/23 1343                  Assessment:   Pancreatic Mass/Ductal Adenocarcinoma s/p ERCP with Sphincterotomy and Stent Placement for Pancreatic Mass/Cancer (Recent)  Irregular ECG Rhythm (SR with Frequent PACs)    - NO Atrial Fibrillation/Flutter  Constipation s/p Enema and Successful Bowel Movement  Leukocytosis  Thrombocytosis    Electrolyte Derangements - Hyponatremia   HTN  RA  ETOH Abuse/Use  Transaminitis/Cirrhosis from ETOH   No Hx of GIB    Plan:   Complete Review of Telemetry Strips on Physical and Electronic Chart as well as available EKGs. NO Evidence of Atrial Fibrillation or Flutter. PT does have an extremely irregular rhythm which is SR with Frequent PACs  Keep K > 4.0 and Mg > 2.0   Start Metoprolol Tartrate 25mg PO BID  Monitor Telemetry Rhythm  ECHO Today - Unremarkable  We will be available as needed    Thank you for your consult.     Pt. seen and examined by me and plan made under my supervision.

## 2023-02-22 LAB
INR BLD: 1.08 (ref 0–1.3)
PROTHROMBIN TIME: 13.9 SECONDS (ref 12.5–14.5)

## 2023-02-22 PROCEDURE — 99232 PR SUBSEQUENT HOSPITAL CARE,LEVL II: ICD-10-PCS | Mod: ,,, | Performed by: INTERNAL MEDICINE

## 2023-02-22 PROCEDURE — 25000003 PHARM REV CODE 250: Performed by: INTERNAL MEDICINE

## 2023-02-22 PROCEDURE — 63600175 PHARM REV CODE 636 W HCPCS: Performed by: PHYSICIAN ASSISTANT

## 2023-02-22 PROCEDURE — 25000003 PHARM REV CODE 250: Performed by: STUDENT IN AN ORGANIZED HEALTH CARE EDUCATION/TRAINING PROGRAM

## 2023-02-22 PROCEDURE — 25000003 PHARM REV CODE 250: Performed by: NURSE PRACTITIONER

## 2023-02-22 PROCEDURE — 25000003 PHARM REV CODE 250: Performed by: PHYSICIAN ASSISTANT

## 2023-02-22 PROCEDURE — 21400001 HC TELEMETRY ROOM

## 2023-02-22 PROCEDURE — 85610 PROTHROMBIN TIME: CPT | Performed by: PHYSICIAN ASSISTANT

## 2023-02-22 PROCEDURE — 99232 SBSQ HOSP IP/OBS MODERATE 35: CPT | Mod: ,,, | Performed by: INTERNAL MEDICINE

## 2023-02-22 PROCEDURE — 63600175 PHARM REV CODE 636 W HCPCS: Performed by: STUDENT IN AN ORGANIZED HEALTH CARE EDUCATION/TRAINING PROGRAM

## 2023-02-22 RX ORDER — HYDROXYZINE PAMOATE 25 MG/1
25 CAPSULE ORAL EVERY 8 HOURS PRN
Status: DISCONTINUED | OUTPATIENT
Start: 2023-02-22 | End: 2023-02-24 | Stop reason: HOSPADM

## 2023-02-22 RX ADMIN — HYDROCODONE BITARTRATE AND ACETAMINOPHEN 1 TABLET: 5; 325 TABLET ORAL at 09:02

## 2023-02-22 RX ADMIN — PANTOPRAZOLE SODIUM 40 MG: 40 TABLET, DELAYED RELEASE ORAL at 09:02

## 2023-02-22 RX ADMIN — METHYLNALTREXONE BROMIDE 12 MG: 12 INJECTION, SOLUTION SUBCUTANEOUS at 12:02

## 2023-02-22 RX ADMIN — DILTIAZEM HYDROCHLORIDE 120 MG: 120 CAPSULE, COATED, EXTENDED RELEASE ORAL at 09:02

## 2023-02-22 RX ADMIN — SODIUM CHLORIDE: 9 INJECTION, SOLUTION INTRAVENOUS at 12:02

## 2023-02-22 RX ADMIN — DIPHENHYDRAMINE HYDROCHLORIDE 25 MG: 25 CAPSULE ORAL at 04:02

## 2023-02-22 RX ADMIN — CEFEPIME 2 G: 2 INJECTION, POWDER, FOR SOLUTION INTRAVENOUS at 02:02

## 2023-02-22 RX ADMIN — HYDROCODONE BITARTRATE AND ACETAMINOPHEN 1 TABLET: 5; 325 TABLET ORAL at 04:02

## 2023-02-22 RX ADMIN — HYDROXYZINE PAMOATE 25 MG: 25 CAPSULE ORAL at 06:02

## 2023-02-22 RX ADMIN — METOPROLOL TARTRATE 25 MG: 25 TABLET, FILM COATED ORAL at 09:02

## 2023-02-22 RX ADMIN — SPIRONOLACTONE 100 MG: 25 TABLET ORAL at 09:02

## 2023-02-22 RX ADMIN — CEFEPIME 2 G: 2 INJECTION, POWDER, FOR SOLUTION INTRAVENOUS at 01:02

## 2023-02-22 RX ADMIN — DIPHENHYDRAMINE HYDROCHLORIDE 25 MG: 25 CAPSULE ORAL at 09:02

## 2023-02-22 RX ADMIN — FOLIC ACID 1 MG: 1 TABLET ORAL at 09:02

## 2023-02-22 RX ADMIN — DIPHENHYDRAMINE HYDROCHLORIDE 25 MG: 25 CAPSULE ORAL at 12:02

## 2023-02-22 RX ADMIN — SODIUM CHLORIDE: 9 INJECTION, SOLUTION INTRAVENOUS at 02:02

## 2023-02-22 NOTE — PROGRESS NOTES
[facility] - Oncology Acute  Hematology/Oncology  Consult Note    Patient Name: Abby Prabhakar  MRN: [id number]  Admission Date: 2/20/2023  Hospital Length of Stay: 2 days  Attending Provider: Chidi Reynolds MD  Consulting Provider: Doron May MD  Principal Problem:<principal problem not specified>    Consults  Subjective:     HPI:  This is a 72-year-old female with extensive medical history.  A rheumatoid arthritis on immunosuppressant medications who started having increasing weight loss and then jaundice.  Was seen by her primary care underwent an ultrasound as her bilirubin was 17.  Showed intrahepatic biliary dilatation, she was seen by GI underwent workup which includes an MRI.  The MRI was consistent with a peripancreatic malignancy ill-defined, and a complex cystic adnexal structure.  She then underwent an ERCP and biliary stent, pathology of which showed a adenocarcinoma.  She continued to have increasing constipation abdominal pain feeling unwell and came to the emergency room and was admitted on February 20th.    02/22/2023:   Patient's ERCP moved to tomorrow.    She is sitting up eating a hamburger, she feels much better, constipation.  The pain is better controlled with Lortab although she still has the pain radiating to her back in the Lortab causing constipation.    She was seen by GI recommended for Relistor then add other medications, and are planning an EUS and nerve block.          Oncology History Overview Note   Patient did have acute onset of jaundice with a bilirubin of 17 on January 30, 2023.  Acute hepatitis panel was negative, TIFFANIE was negative, smooth muscle antibody was negative, ceruloplasmin was normal, HIV was negative,  By February 7th her bilirubin edvin to 24.6.  And on February 16th she underwent a ERCP and biliary stent.  She then presented back to the emergency room on February 20th with constipation and nausea.     Pancreatic cancer   2/14/2023 Imaging  Significant Findings    02/14/2023, MRI of the abdomen normal MRCP,  There is an ill-defined area of intrinsic hypointense T1 signal approximate 2.3 x 1.6 cm.  Is inseparable from the adjacent duodenal wall and ampulla, there is a normal fat signal surrounding the SMA and celiac artery, no peripancreatic edema or fluid collection.  The pancreatic body and tail appears slightly atrophic with a normal signal and contour, the main pancreatic duct appears borderline measuring 4 mm within the head and the body measuring just over 2 mm within the tail.  No local pancreatic ductal filling defects, there is pronounced intrahepatic and extrahepatic biliary dilatation, with the common bile duct measures 14 mm, the common hepatic duct 15 mm, with a smooth tapering within the liver extending to the periphery    Impression severe intrahepatic  biliary dilatation, no gallstone, ill-defined area of abnormal signal within the head of the pancreas near the ampulla measuring 2.3 cm concerning for primary periampullary carcinoma, no lymphadenopathy, cystic adnexal lesion     2/16/2023 Initial Diagnosis    Pancreatic cancer versus bile duct carcinoma     2/16/2023 Imaging Significant Findings    Impression:            - A biliary tract obstruction secondary to what                          appeared to be a mass was found in the lower third                          of the main duct.                          - A biliary sphincterotomy was performed.                          - One plastic stent was placed into the common                          bile duct.                          - One plastic stent was placed into the ventral                          pancreatic duct.      2/16/2023 Pathology Significant Finding        FINAL DIAGNOSIS       COMMON BILE DUCT BRUSH (ONE CYTOLOGIC SMEAR AND THIN PREP CYTOLOGY):       SPARSE AGGREGATES OF ATYPICAL EPITHELIAL CELLS CONSISTENT WITH DUCTAL   ADENOCARCINOMA.      2/20/2023 Imaging Significant  Findings    CT abdomen pelvis:  Coronary calcifications, small right pleural effusion, gallbladder distended, sludge, common bile duct stent, no intrahepatic biliary dilatation, atrophy pancreas,  Subtle hypodense lesion region of the ampulla measuring 1.8 cm, stent in the region of the uncinate process, mixed density collection in the region the tip of the stent, bilateral adnexal cysts,          Medications:  Continuous Infusions:   sodium chloride 0.9% 75 mL/hr at 23 0042     Scheduled Meds:   ceFEPime (MAXIPIME) IVPB  2 g Intravenous Q12H    diltiaZEM  120 mg Oral Daily    folic acid  1 mg Oral Daily    [START ON 2023] linaCLOtide  145 mcg Oral Before breakfast    metoprolol tartrate  25 mg Oral BID    pantoprazole  40 mg Oral Daily    spironolactone  100 mg Oral Daily     PRN Meds:acetaminophen, acetaminophen, dextrose 10%, dextrose 10%, diphenhydrAMINE, glucagon (human recombinant), glucose, glucose, HYDROcodone-acetaminophen, hydrOXYzine pamoate, ondansetron, traMADoL     Review of patient's allergies indicates:   Allergen Reactions    Atorvastatin     Penicillins         Past Medical History:   Diagnosis Date    Hypertension     Rheumatoid arthritis, unspecified      Past Surgical History:   Procedure Laterality Date     SECTION      ERCP N/A 2023    Procedure: ERCP;  Surgeon: Barbra Schrader III, MD;  Location: [facility] ENDOSCOPY;  Service: Gastroenterology;  Laterality: N/A;    ERCP W/ SPHICTEROTOMY  2023    Procedure: ERCP, WITH SPHINCTEROTOMY;  Surgeon: Barbra Schrader III, MD;  Location: [facility] ENDOSCOPY;  Service: Gastroenterology;;    ERCP, WITH STENT INSERTION  2023    Procedure: ERCP, WITH STENT INSERTION;  Surgeon: Barbra Schrader III, MD;  Location: [facility] ENDOSCOPY;  Service: Gastroenterology;;    HYSTERECTOMY       Family History    None       Tobacco Use    Smoking status: Never    Smokeless tobacco: Never   Substance and Sexual Activity    Alcohol use: Not  Currently     Comment: Lamar 1/2 pt daily    Drug use: Never    Sexual activity: Not on file       Review of Systems   Constitutional:  Positive for unexpected weight change. Negative for activity change.   Eyes: Negative.    Cardiovascular: Negative.    Gastrointestinal:  Positive for constipation. Negative for blood in stool.   Endocrine: Negative.    Genitourinary: Negative.    Skin:  Positive for rash.   Allergic/Immunologic: Negative.    Neurological: Negative.    Hematological: Negative.    Psychiatric/Behavioral: Negative.     Objective:     Vital Signs (Most Recent):  Temp: 97.4 °F (36.3 °C) (02/22/23 1157)  Pulse: (!) 52 (02/22/23 1157)  Resp: 20 (02/22/23 0953)  BP: 114/70 (02/22/23 1157)  SpO2: 99 % (02/22/23 1157) Vital Signs (24h Range):  Temp:  [97.4 °F (36.3 °C)-98.2 °F (36.8 °C)] 97.4 °F (36.3 °C)  Pulse:  [52-69] 52  Resp:  [14-20] 20  SpO2:  [98 %-100 %] 99 %  BP: (114-137)/(66-83) 114/70     Weight: 63.5 kg (140 lb)  Body mass index is 24.81 kg/m².  Body surface area is 1.68 meters squared.      Intake/Output Summary (Last 24 hours) at 2/22/2023 1403  Last data filed at 2/22/2023 0600  Gross per 24 hour   Intake 2370 ml   Output --   Net 2370 ml         Physical Exam  Vitals reviewed.   Constitutional:       General: She is awake. She is not in acute distress.     Appearance: Normal appearance. She is normal weight.      Comments: Jaundice   HENT:      Head: Normocephalic and atraumatic.      Right Ear: Tympanic membrane normal.      Left Ear: Tympanic membrane normal.      Mouth/Throat:      Mouth: Mucous membranes are moist.      Pharynx: Oropharynx is clear.   Eyes:      Extraocular Movements: Extraocular movements intact.      Pupils: Pupils are equal, round, and reactive to light.      Comments: Jaundice   Cardiovascular:      Rate and Rhythm: Normal rate and regular rhythm.      Pulses: Normal pulses.      Heart sounds: Normal heart sounds.   Pulmonary:      Effort: Pulmonary effort is  normal.      Breath sounds: Normal breath sounds and air entry.   Abdominal:      General: Abdomen is flat. Bowel sounds are normal.      Palpations: Abdomen is soft.      Tenderness: There is no abdominal tenderness.   Musculoskeletal:      Cervical back: Normal range of motion.      Right lower leg: No edema.      Left lower leg: No edema.   Lymphadenopathy:      Cervical: No cervical adenopathy.      Upper Body:      Right upper body: No axillary adenopathy.      Left upper body: No axillary adenopathy.      Lower Body: No right inguinal adenopathy. No left inguinal adenopathy.   Skin:     General: Skin is warm and dry.      Coloration: Skin is jaundiced.   Neurological:      General: No focal deficit present.      Mental Status: She is alert and oriented to person, place, and time. Mental status is at baseline.      GCS: GCS eye subscore is 4. GCS verbal subscore is 5. GCS motor subscore is 6.      Cranial Nerves: Cranial nerves 2-12 are intact.   Psychiatric:         Attention and Perception: Attention normal.         Mood and Affect: Mood and affect normal.         Speech: Speech normal.         Behavior: Behavior is cooperative.         Thought Content: Thought content normal.         Cognition and Memory: Cognition normal.         Judgment: Judgment normal.       Significant Labs:   CBC:   Recent Labs   Lab 02/21/23  0313   WBC 14.3*   HGB 10.4*   HCT 29.5*   *     , CMP:   Recent Labs   Lab 02/21/23  0317   *   K 3.9   CO2 23   BUN 24.2*   CREATININE 0.80   CALCIUM 7.6*   ALBUMIN 2.0*   BILITOT 13.5*   ALKPHOS 220*   AST 62*   *         Diagnostic Results:  I have reviewed all pertinent imaging results/findings within the past 24 hours.    Assessment/Plan:     Active Hospital Problems    Diagnosis    Pancreatic cancer     This is a 72-year-old female History of hypertension, AFib, rheumatoid arthritis, h/o ETOH use with recent onset of jaundice, biliary obstruction status post recent  ERCP and sent with biliary brushings consistent with adenocarcinoma     Appears to be a T2 N0 M0 adenocarcinoma of the pancreas to biliary duct with obstructive jaundice.  Versus  cholangiocarcinoma      Recs  Consider celiac plexus nerve block with repeat Stent   And EUS    B complex vitamin with risk of DTs   Will eventually need outpatient PET scan   No acute intervention from Oncology until her obstructive jaundice is resolved, and no evidence of secondary infection, will need outpatient follow-up    On follow-up will need CBC, CMP, CEA, CA 19 9   Will need follow-up of the EUS and ERCP .    Will eventually need outpatient PET imaging, and follow-up.  Patient the family are contemplating going to [place] as the rest of the family lives near the [place].    Discussed the goals of 1st relieving the obstructive jaundice, follow-up other staging images , then discussion chemotherapy or surgery.      Thank you for your consult.     If patient wants to go to [facility] in the [place] now has a good time for her to go after this hospitalization.  Case management can refer patient to [facility] if family wishes, versus patient can also get themselves on the [facility] website.    Alternatively, I gave the patient the family the option of seeing me on outpatient basis within a week to 10 days of her discharge.    Doron May MD  Hematology/Oncology  [facility]  Oncology [facility]

## 2023-02-22 NOTE — PLAN OF CARE
Problem: Adult Inpatient Plan of Care  Goal: Plan of Care Review  Outcome: Ongoing, Progressing  Goal: Patient-Specific Goal (Individualized)  Outcome: Ongoing, Progressing  Goal: Absence of Hospital-Acquired Illness or Injury  Outcome: Ongoing, Progressing  Intervention: Prevent and Manage VTE (Venous Thromboembolism) Risk  Flowsheets (Taken 2/22/2023 8620)  VTE Prevention/Management:   ambulation promoted   bleeding risk assessed  Goal: Optimal Comfort and Wellbeing  Outcome: Ongoing, Progressing  Goal: Readiness for Transition of Care  Outcome: Ongoing, Progressing

## 2023-02-22 NOTE — PROGRESS NOTES
Inpatient Nutrition Evaluation    Admit Date: 2/20/2023   Total duration of encounter: 2 days    Nutrition Recommendation/Prescription     Regular diet as tolerated.    Boost Max daily w/ breakfast. 160 kcals and 30 g protein per serving.     Monitor GI symptoms, po intake, weight, labs.     Nutrition Assessment     Chart Review    Reason Seen: continuous nutrition monitoringGI ca     Malnutrition Screening Tool Results   Have you recently lost weight without trying?: No  Have you been eating poorly because of a decreased appetite?: No   MST Score: 0     Diagnosis:  1. Right upper quadrant abdominal pain    2. Jaundice    3. Elevated bilirubin    4. SOB (shortness of breath)    5. Pancreatic cancer   6. Atrial fibrillation, unspecified type    7. EKG abnormality       Relevant Medical History: History of hypertension, AFib, rheumatoid arthritis, h/o ETOH use with recent onset of jaundice, biliary obstruction     Nutrition-Related Medications: Cefepime, Folic acid, Metoprolol     Nutrition-Related Labs:   2/21: Na 130, BUN 24.2, Ca 7.6, , Alb 2.0, TBIl 13.5,      Diet Order: Diet Adult Regular  Diet NPO  Diet NPO  Oral Supplement Order: none  Appetite/Oral Intake: good/% of meals  Factors Affecting Nutritional Intake: none identified (she has abdominal pain at times but says she eats 100% consistently)    Food/Amish/Cultural Preferences: none reported  Food Allergies: none reported       Wound(s):   n/a    Comments    2/22/23: Pt reports good appetite, ate 100% of lunch, tolerating well so far, reports significant intentional and medically-assisted weight loss over the past year, she no longer takes this medication, she has lost several more pounds since stopping medication around Thanksgiving '22 but not at a rate consistent w/ malnutrition, she does not like to eat breakfast. With some persuasion to consume more protein she agrees to Boost Max w/ breakfast, not open to other forms of Boost.    "    Anthropometrics    Height: 5' 2.99" (160 cm) Height Method: Stated  Last Weight: 63.5 kg (140 lb) (02/20/23 0553) Weight Method: Standard Scale  BMI (Calculated): 24.8  BMI Classification: normal (BMI 18.5-24.9)     Ideal Body Weight (IBW), Female: 114.95 lb     % Ideal Body Weight, Female (lb): 121.79 %                             Usual Weight Provided By: patient    Wt Readings from Last 3 Encounters:   02/20/23 0553 63.5 kg (140 lb)   02/16/23 1321 63.5 kg (140 lb)   02/15/23 1439 63.5 kg (140 lb)   07/13/22 1044 72.6 kg (160 lb)      Weight Change(s) Since Admission:  Admit Weight: 63.5 kg (140 lb) (02/20/23 0553)      Patient Education    Not applicable.    Monitoring & Evaluation     Dietitian will monitor food and beverage intake, weight, electrolyte/renal panel, and gastrointestinal profile.  Nutrition Risk/Follow-Up: low (follow-up in 5-7 days)  Patients assigned 'low nutrition risk' status do not qualify for a full nutritional assessment but will be monitored and re-evaluated in a 5-7 day time period. Please consult if re-evaluation needed sooner.   "

## 2023-02-22 NOTE — PLAN OF CARE
Problem: Adult Inpatient Plan of Care  Goal: Plan of Care Review  Outcome: Ongoing, Progressing  Flowsheets (Taken 2/21/2023 2246)  Plan of Care Reviewed With: patient  Goal: Absence of Hospital-Acquired Illness or Injury  Outcome: Ongoing, Progressing  Intervention: Identify and Manage Fall Risk  Flowsheets (Taken 2/21/2023 2246)  Safety Promotion/Fall Prevention:   assistive device/personal item within reach   Fall Risk signage in place   nonskid shoes/socks when out of bed  Intervention: Prevent Skin Injury  Flowsheets (Taken 2/21/2023 2246)  Body Position: position changed independently  Skin Protection: transparent dressing maintained  Intervention: Prevent and Manage VTE (Venous Thromboembolism) Risk  Flowsheets (Taken 2/21/2023 2246)  Activity Management:   Ambulated to bathroom - L4   Ambulated in room - L4  VTE Prevention/Management: ambulation promoted  Range of Motion: ROM (range of motion) performed  Intervention: Prevent Infection  Flowsheets (Taken 2/21/2023 2246)  Infection Prevention: hand hygiene promoted  Goal: Optimal Comfort and Wellbeing  Outcome: Ongoing, Progressing  Intervention: Monitor Pain and Promote Comfort  Flowsheets (Taken 2/21/2023 2246)  Pain Management Interventions:   awakened for pain meds per patient request   care clustered   quiet environment facilitated  Intervention: Provide Person-Centered Care  Flowsheets (Taken 2/21/2023 2246)  Trust Relationship/Rapport:   care explained   emotional support provided   questions answered  Goal: Readiness for Transition of Care  Outcome: Ongoing, Progressing

## 2023-02-22 NOTE — PROGRESS NOTES
"Gastroenterology Progress Note    Subjective:   Epigastric and back pain is controlled with norco.  Feels bloated.  Tolerated regular diet yesterdya without worsening symptoms.  No BM since the enema in the ER. Pruritis that resolves with Benadryl. Afebrile. WBC and LFTs have been improving.      Objective:  Vital Signs:  /68   Pulse 69   Temp 98 °F (36.7 °C) (Oral)   Resp 18   Ht 5' 2.99" (1.6 m)   Wt 63.5 kg (140 lb)   SpO2 99%   BMI 24.81 kg/m²   Body mass index is 24.81 kg/m².    Physical Exam:  Physical Exam  Constitutional:       General: She is not in acute distress.  HENT:      Head: Normocephalic and atraumatic.   Eyes:      General: Scleral icterus present.   Cardiovascular:      Rate and Rhythm: Normal rate.   Pulmonary:      Effort: Pulmonary effort is normal. No respiratory distress.   Abdominal:      General: Bowel sounds are normal. There is no distension.      Palpations: Abdomen is soft.      Tenderness: There is no abdominal tenderness. There is no guarding or rebound.   Musculoskeletal:         General: Normal range of motion.      Right lower leg: No edema.      Left lower leg: No edema.   Skin:     General: Skin is warm and dry.      Coloration: Skin is jaundiced.   Neurological:      Mental Status: She is alert and oriented to person, place, and time.       Labs:  Recent Results (from the past 48 hour(s))   Urinalysis, Reflex to Urine Culture    Collection Time: 02/20/23 11:12 AM    Specimen: Urine   Result Value Ref Range    Color, UA Dark Yellow Yellow, Light-Yellow, Dark Yellow, Nicolle, Straw    Appearance, UA Clear Clear    Specific Gravity, UA >=1.040 (H) 1.001 - 1.030    pH, UA 7.0 5.0 - 8.5    Protein, UA Trace (A) Negative mg/dL    Glucose, UA Trace (A) Negative, Normal mg/dL    Ketones, UA Negative Negative mg/dL    Blood, UA Negative Negative unit/L    Bilirubin, UA 2+ (A) Negative mg/dL    Urobilinogen, UA 1.0 0.2, 1.0, Normal mg/dL    Nitrites, UA Negative Negative    " Leukocyte Esterase, UA Trace (A) Negative unit/L   Urinalysis, Microscopic    Collection Time: 02/20/23 11:12 AM   Result Value Ref Range    RBC, UA <5 <=5 /HPF    WBC, UA <5 <=5 /HPF    Squamous Epithelial Cells, UA <5 <=5 /HPF    Bacteria, UA None Seen None Seen, Rare, Occasional /HPF   Brain natriuretic peptide    Collection Time: 02/20/23 12:37 PM   Result Value Ref Range    Natriuretic Peptide 70.1 <=100.0 pg/mL   Blood Culture    Collection Time: 02/20/23  2:31 PM    Specimen: Blood   Result Value Ref Range    CULTURE, BLOOD ([facility]) No Growth At 24 Hours    Blood Culture    Collection Time: 02/20/23  2:31 PM    Specimen: Blood   Result Value Ref Range    CULTURE, BLOOD ([facility]) No Growth At 24 Hours    CBC with Differential    Collection Time: 02/21/23  3:13 AM   Result Value Ref Range    WBC 14.3 (H) 4.5 - 11.5 x10(3)/mcL    RBC 3.25 (L) 4.20 - 5.40 x10(6)/mcL    Hgb 10.4 (L) 12.0 - 16.0 g/dL    Hct 29.5 (L) 37.0 - 47.0 %    MCV 90.8 80.0 - 94.0 fL    MCH 32.0 pg    MCHC 35.3 33.0 - 36.0 g/dL    RDW 17.4 (H) 11.5 - 17.0 %    Platelet 415 (H) 130 - 400 x10(3)/mcL    MPV 11.3 (H) 7.4 - 10.4 fL    Neut % 82.5 %    Lymph % 5.6 %    Mono % 7.9 %    Eos % 0.3 %    Basophil % 0.2 %    Lymph # 0.80 0.6 - 4.6 x10(3)/mcL    Neut # 11.84 (H) 2.1 - 9.2 x10(3)/mcL    Mono # 1.13 0.1 - 1.3 x10(3)/mcL    Eos # 0.04 0 - 0.9 x10(3)/mcL    Baso # 0.03 0 - 0.2 x10(3)/mcL    IG# 0.50 (H) 0 - 0.04 x10(3)/mcL    IG% 3.5 %    NRBC% 0.0 %   Comprehensive Metabolic Panel (CMP)    Collection Time: 02/21/23  3:17 AM   Result Value Ref Range    Sodium Level 130 (L) 136 - 145 mmol/L    Potassium Level 3.9 3.5 - 5.1 mmol/L    Chloride 98 98 - 107 mmol/L    Carbon Dioxide 23 23 - 31 mmol/L    Glucose Level 97 82 - 115 mg/dL    Blood Urea Nitrogen 24.2 (H) 9.8 - 20.1 mg/dL    Creatinine 0.80 0.55 - 1.02 mg/dL    Calcium Level Total 7.6 (L) 8.4 - 10.2 mg/dL    Protein Total 4.3 (L) 5.8 - 7.6 gm/dL    Albumin Level 2.0 (L) 3.4 - 4.8 g/dL     Globulin 2.3 (L) 2.4 - 3.5 gm/dL    Albumin/Globulin Ratio 0.9 (L) 1.1 - 2.0 ratio    Bilirubin Total 13.5 (H) <=1.5 mg/dL    Alkaline Phosphatase 220 (H) 40 - 150 unit/L    Alanine Aminotransferase 112 (H) 0 - 55 unit/L    Aspartate Aminotransferase 62 (H) 5 - 34 unit/L    eGFR >60 mls/min/1.73/m2   Echo Saline Bubble? No    Collection Time: 02/21/23  3:39 PM   Result Value Ref Range    BSA 1.68 m2    TDI SEPTAL 0.09 m/s    LV LATERAL E/E' RATIO 6.00 m/s    LV SEPTAL E/E' RATIO 8.67 m/s    Right Atrial Pressure (from IVC) 3 mmHg    EF 56 %    Left Ventricular Outflow Tract Mean Velocity 0.49 cm/s    Left Ventricular Outflow Tract Mean Gradient 1.00 mmHg    TDI LATERAL 0.13 m/s    LVIDd 4.79 3.5 - 6.0 cm    IVS 0.92 0.6 - 1.1 cm    Posterior Wall 0.92 0.6 - 1.1 cm    LVIDs 3.10 2.1 - 4.0 cm    FS 35 28 - 44 %    Sinus 3.60 cm    LV mass 151.63 g    LA size 3.90 cm    TAPSE 1.83 cm    Left Ventricle Relative Wall Thickness 0.38 cm    AV mean gradient 3 mmHg    AV valve area 1.85 cm2    AV Velocity Ratio 0.63     AV index (prosthetic) 0.59     MV mean gradient 1 mmHg    MV valve area p 1/2 method 2.62 cm2    MV valve area by continuity eq 2.23 cm2    E/A ratio 1.95     Mean e' 0.11 m/s    LVOT diameter 2.00 cm    LVOT area 3.1 cm2    LVOT peak jose 0.75 m/s    LVOT peak VTI 17.10 cm    Ao peak jose 1.19 m/s    Ao VTI 29.1 cm    LVOT stroke volume 53.69 cm3    AV peak gradient 6 mmHg    MV peak gradient 3 mmHg    TV rest pulmonary artery pressure 22 mmHg    E/E' ratio 7.09 m/s    MV Peak E Jose 0.78 m/s    TR Max Jose 2.20 m/s    MV VTI 24.1 cm    MV stenosis pressure 1/2 time 84.00 ms    MV Peak A Jose 0.40 m/s    LV Systolic Volume 37.90 mL    LV Systolic Volume Index 22.8 mL/m2    LV Diastolic Volume 107.00 mL    LV Diastolic Volume Index 64.46 mL/m2    LV Mass Index 91 g/m2    Triscuspid Valve Regurgitation Peak Gradient 19 mmHg    LA Volume Index (Mod) 34.3 mL/m2    LA volume (mod) 56.90 cm3   Protime-INR    Collection  Time: 02/22/23  3:27 AM   Result Value Ref Range    PT 13.9 12.5 - 14.5 seconds    INR 1.08 0.00 - 1.30       Imaging:    No new radiological images to review.      Assessment/Plan:  1. Right upper quadrant abdominal pain    2. Jaundice    3. Elevated bilirubin    4. SOB (shortness of breath)    5. Pancreatic mass    6. Atrial fibrillation, unspecified type    7. EKG abnormality       71 yo F known to Dr. Jak Gonzalez.  Patient with a PMH of HTN and RA previously on methotrexate and Plaquenil.  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  ERCP 2/16/23 with placement of plastic stents in CBD and PD.  CBD brushings: ductal adenocarcinoma.  Here with abdominal pain.  Found to have new leukocytosis, improving LFTs, and CT with fluid collection about the pancreas.      Adenocarcinoma of the pancreas vs cholangiocarcinoma  Obstructive jaundice     CT abdomen/pelvis with IV contrast noted distended gallbladder, sludge, CBD stent in place, trace pneumobilia, no intrahepatic biliary ductal dilation, pancreas atrophy, questionable subtle hypodense lesion at the region of the ampulla the pancreatic head measuring 1.8 cm, stent in the region of the uncinate process extending beyond the expected contour of the pancreas, mixed density collection in the region of the tip of the stent which extends to encase the SMA in about the celiac measuring 4.5 cm, no pancreatic ductal dilation, moderate stool burden, no bowel obstruction.    Lipase normal.    - Diet as tolerated today  - Keep NPO after midnight  - ERCP tomorrow to remove PD stent and place metal stent.   - Scheduled for EUS with Dr. Napoles on 3/1.  It seems oncology still feels she needs this based on consult note.     3. Constipation   - Give relistor now and start linzess in AM.        Jak Woods PA-C

## 2023-02-22 NOTE — PROGRESS NOTES
[facility]  Hospital Medicine Progress Note        Chief Complaint: Inpatient Follow-up for     HPI:   Mrs Prabhakar is a 72 y.o. female with a past medical history of essential hypertension, atrial fibrillation and rheumatoid arthritis presented to [facility] on 2/20/2023 for constipation and nausea. Patient reports symptoms began 4 days ago after bile duct stent placement by Dr. Schrader. Patient reports last bowel movement was 4 days ago. Patient reports no improvement with home medications. Patient also endorses abdominal pain with intermittent shortness of breath.  Patient denies fever, chills, vomiting, and chest pain.  Initial vital signs in ED were /72, pulse 99, respirations 20, temperature 36.3° C, and SpO2 99% on room air.  Labs revealed WBC 20.5, RBC 3.56, hemoglobin 11 5, hematocrit 31.7, platelets 530, neutrophils 17.13, sodium 131, BUN 30.8, creatinine 0.99, alkaline phosphatase 267, albumin 2.3, bilirubin total 18.4, direct bilirubin 9.8, AST 94, , BNP 70.1, and troponin undetectable. EKG revealed atrial fibrillation with heart rate of 95 bpm.  Chest x-ray revealed trace right pleural effusion. CT Abdomen/Pelvis with Contrast revealed no evidence of bowel obstruction, moderate stool burden, pneumobilia with recent ERCP with placement of biliary and pancreatic stents, distended gallbladder, and mixed density peripancreatic collection at tip of pancreatic stent. Patient had bowel movement in ED after enema. Patient was given LR bolus, IV morphine 4 mg, IV Zofran 4 mg, and IV cefepime 2 g in ED.  GI was consulted.  Patient was admitted to hospital medicine service for further medical management. GI planning for ERCP on 02/22 for removal of stent & evaluation of peripancreatic fluid collection which patient will be kept NPO overnight.  Cardiology consulted for irregular heart rhythm which was thought to be secondary to PACs.  Echocardiogram ordered, will follow  up.    Interval Hx:   Today, Mrs. Prabhakar was seen at bedside.  She was accompanied by her family including her daughter.  She stated she was feeling somewhat better but continued to have pruritus and appeared icteric.  Also endorsed mild-to-moderate epigastric abdominal pain radiating to right mid back and left shoulder.  She was noted to be upset as she did not know what the plan was.  It was explained to her that her previous ERCP biopsy had revealed ductal adenocarcinoma which the patient and her daughter knew from perusing the chart and the patient portal.  They were particularly upset regarding the plan for ERCP and what was being done regarding the fluid collection.  It was explained to them that she was being covered for an inch infectious process with IV antibiotics and that wilber pancreatic hemorrhage, benign fluid collection, developing abscess were all possible differentials.  It was also explained to them that it did not appear from her EKG and telemetry strip that she had atrial fibrillation and that her irregular heart rhythm was likely due to prolonged refractory period following PACs.  Will follow-up GI recommendations and procedure report tomorrow.  Oncology consulted, will follow recommendations.    Objective/physical exam:  General: alert, icteric appearing lady lying comfortably in bed, in no acute distress  HENT: oral and oropharyngeal mucosa moist, pink, with no erythema or exudates, no ear pain or discharge  Neck: normal neck movement, no lymph nodes or swellings, no JVD or Carotid bruit  Respiratory: clear breathing sounds bilaterally, no crackles, rales, ronchi or wheezes  Cardiovascular: clear S1 and S2, no murmurs, rubs or gallops  Peripheral Vascular: no lesions, ulcers or erosions, normal peripheral pulses and no pedal edema  Gastrointestinal: soft, non-distended abdomen with TTP in epigastric region; no guarding, rigidity or rebound tenderness, normal bowel sounds  Integumentary: normal  skin color, no rashes or lesions  Neuro: AAO x 3; motor strength 5/5 in B/L UEs & LEs; sensation intact to gross and fine touch B/L; CN II-XII grossly intact    VITAL SIGNS: 24 HRS MIN & MAX LAST   Temp  Min: 97.7 °F (36.5 °C)  Max: 98.2 °F (36.8 °C) 97.7 °F (36.5 °C)   BP  Min: 110/72  Max: 144/72 128/73   Pulse  Min: 60  Max: 118  60   Resp  Min: 18  Max: 20 20   SpO2  Min: 97 %  Max: 99 % 99 %     Recent Labs   Lab 02/20/23  0629 02/21/23  0313   WBC 20.5* 14.3*   RBC 3.56* 3.25*   HGB 11.5* 10.4*   HCT 31.7* 29.5*   MCV 89.0 90.8   MCH 32.3 32.0   MCHC 36.3* 35.3   RDW 17.6* 17.4*   * 415*   MPV 12.4* 11.3*     Recent Labs   Lab 02/20/23  0751 02/21/23 0317   * 130*   K 3.8 3.9   CO2 23 23   BUN 30.8* 24.2*   CREATININE 0.99 0.80   CALCIUM 8.0* 7.6*   ALBUMIN 2.3* 2.0*   ALKPHOS 267* 220*   * 112*   AST 94* 62*   BILITOT 18.4* 13.5*     Microbiology Results (last 7 days)       Procedure Component Value Units Date/Time    Blood Culture [[id number]]  (Normal) Collected: 02/20/23 1431    Order Status: Completed Specimen: Blood Updated: 02/21/23 1500     CULTURE, BLOOD ([facility]) No Growth At 24 Hours    Blood Culture [[id number]]  (Normal) Collected: 02/20/23 1431    Order Status: Completed Specimen: Blood Updated: 02/21/23 1500     CULTURE, BLOOD ([facility]) No Growth At 24 Hours    Blood Culture [[id number]]     Order Status: Canceled Specimen: Blood from Arm, Left     Blood Culture [[id number]]     Order Status: Canceled Specimen: Blood from Arm, Right            Scheduled Med:   ceFEPime (MAXIPIME) IVPB  2 g Intravenous Q12H    colestipoL  2 g Oral Daily    diltiaZEM  120 mg Oral Daily    folic acid  1 mg Oral Daily    metoprolol tartrate  25 mg Oral BID    pantoprazole  40 mg Oral Daily    spironolactone  100 mg Oral Daily      Continuous Infusions:   sodium chloride 0.9% 75 mL/hr at 02/21/23 1007      PRN Meds:  acetaminophen, acetaminophen, dextrose 10%, dextrose 10%, diphenhydrAMINE, glucagon (human  recombinant), glucose, glucose, HYDROcodone-acetaminophen, ondansetron, traMADoL     Assessment/Plan:  Peripancreatic Mixed Density Collection 2/2 possible Pancreatitis vs Abscess vs Hemorrhage  Biliary Obstruction 2/2 Pancreatic Mass s/p Biliary Stent on 2/16/2023  Leukocytosis  Pancreatic Mass (cytology positive for Adenocarcinoma)  Hyperbilirubinemia 2/2 Obstruction vs Recent ERCP  Transaminitis  Thrombocytosis  Irregular Rhythm 2/2 PACs  Constipation   Hyponatremia  Normocytic Anemia  HTN  Rheumatoid Arthritis    Patient continues to be admitted for close monitoring   GI consulted; follow recommendations  GI planning on ERCP tomorrow on 02/22, will follow up on report   Oncology consulted; follow recommendations regarding further workup and management of likely malignant pancreatic mass  Cardiology on board; follow recommendations  Patient continued on IV cefepime 2 g q.12h  Continued on diltiazem 120 mg, folic acid 1 mg, colestipol 2 g, metoprolol tartrate 25 mg b.i.d., Protonix 40 mg daily, Aldactone 100 mg  Continue monitoring patient's symptoms   Patient continued on IV NS 75 mL/hour   Echocardiogram ordered; follow-up    VTE prophylaxis: SCDs    Patient condition:  Stable    Anticipated discharge and Disposition:     Pending    All diagnosis and differential diagnosis have been reviewed; assessment and plan has been documented; I have personally reviewed the labs and test results that are presently available; I have reviewed the patients medication list; I have reviewed the consulting providers response and recommendations. I have reviewed or attempted to review medical records based upon their availability    All of the patient's questions have been  addressed and answered. Patient's is agreeable to the above stated plan. I will continue to monitor closely and make adjustments to medical management as needed.  _____________________________________________________________________    Nutrition Status:   NPO    Radiology:  Echo Saline Bubble? No  · The left ventricle is normal in size with normal systolic function. LVEF   55-60%.  · LV global longitudinal strain measures -17.9%.  · Normal left ventricular diastolic function.  · Normal right ventricular size with normal right ventricular systolic   function.  · Mild mitral regurgitation.  · Mild to moderate tricuspid regurgitation.  · Mild pulmonic regurgitation.  · The estimated PA systolic pressure is 22 mmHg.         Chidi Reynolds MD   02/21/2023

## 2023-02-23 ENCOUNTER — ANESTHESIA EVENT (OUTPATIENT)
Dept: ENDOSCOPY | Facility: HOSPITAL | Age: 73
DRG: 435 | End: 2023-02-23
Payer: MEDICARE

## 2023-02-23 ENCOUNTER — ANESTHESIA (OUTPATIENT)
Dept: ENDOSCOPY | Facility: HOSPITAL | Age: 73
DRG: 435 | End: 2023-02-23
Payer: MEDICARE

## 2023-02-23 LAB
ABS NEUT (OLG): 10.84 X10(3)/MCL (ref 2.1–9.2)
ALBUMIN SERPL-MCNC: 2 G/DL (ref 3.4–4.8)
ALBUMIN/GLOB SERPL: 0.8 RATIO (ref 1.1–2)
ALP SERPL-CCNC: 193 UNIT/L (ref 40–150)
ALT SERPL-CCNC: 88 UNIT/L (ref 0–55)
AST SERPL-CCNC: 51 UNIT/L (ref 5–34)
BILIRUBIN DIRECT+TOT PNL SERPL-MCNC: 9.1 MG/DL
BUN SERPL-MCNC: 18 MG/DL (ref 9.8–20.1)
CALCIUM SERPL-MCNC: 7.5 MG/DL (ref 8.4–10.2)
CANCER AG19-9 SERPL-ACNC: 2235.7 UNIT/ML (ref 0–37)
CEA SERPL-MCNC: 11.82 NG/ML (ref 0–3)
CHLORIDE SERPL-SCNC: 105 MMOL/L (ref 98–107)
CO2 SERPL-SCNC: 18 MMOL/L (ref 23–31)
CREAT SERPL-MCNC: 0.74 MG/DL (ref 0.55–1.02)
ERYTHROCYTE [DISTWIDTH] IN BLOOD BY AUTOMATED COUNT: 15.8 % (ref 11.5–17)
GFR SERPLBLD CREATININE-BSD FMLA CKD-EPI: >60 MLS/MIN/1.73/M2
GLOBULIN SER-MCNC: 2.4 GM/DL (ref 2.4–3.5)
GLUCOSE SERPL-MCNC: 109 MG/DL (ref 82–115)
HCT VFR BLD AUTO: 29.6 % (ref 37–47)
HGB BLD-MCNC: 10.1 G/DL (ref 12–16)
IMM GRANULOCYTES # BLD AUTO: 0.93 X10(3)/MCL (ref 0–0.04)
IMM GRANULOCYTES NFR BLD AUTO: 7.2 %
INSTRUMENT WBC (OLG): 12.9 X10(3)/MCL
LYMPHOCYTES NFR BLD MANUAL: 0.9 X10(3)/MCL
LYMPHOCYTES NFR BLD MANUAL: 7 %
MCH RBC QN AUTO: 32 PG
MCHC RBC AUTO-ENTMCNC: 34.1 G/DL (ref 33–36)
MCV RBC AUTO: 93.7 FL (ref 80–94)
MONOCYTES NFR BLD MANUAL: 1.29 X10(3)/MCL (ref 0.1–1.3)
MONOCYTES NFR BLD MANUAL: 10 %
MYELOCYTES NFR BLD MANUAL: 2 %
NEUTROPHILS NFR BLD MANUAL: 82 %
NRBC BLD AUTO-RTO: 0 %
PLATELET # BLD AUTO: 478 X10(3)/MCL (ref 130–400)
PLATELET # BLD EST: ABNORMAL 10*3/UL
PMV BLD AUTO: 10.7 FL (ref 7.4–10.4)
POTASSIUM SERPL-SCNC: 4.3 MMOL/L (ref 3.5–5.1)
PROT SERPL-MCNC: 4.4 GM/DL (ref 5.8–7.6)
RBC # BLD AUTO: 3.16 X10(6)/MCL (ref 4.2–5.4)
RBC MORPH BLD: ABNORMAL
SODIUM SERPL-SCNC: 128 MMOL/L (ref 136–145)
TARGETS BLD QL SMEAR: ABNORMAL
WBC # SPEC AUTO: 12.9 X10(3)/MCL (ref 4.5–11.5)

## 2023-02-23 PROCEDURE — 25000003 PHARM REV CODE 250: Performed by: NURSE PRACTITIONER

## 2023-02-23 PROCEDURE — 80053 COMPREHEN METABOLIC PANEL: CPT | Performed by: PHYSICIAN ASSISTANT

## 2023-02-23 PROCEDURE — 86301 IMMUNOASSAY TUMOR CA 19-9: CPT | Performed by: PHYSICIAN ASSISTANT

## 2023-02-23 PROCEDURE — 37000009 HC ANESTHESIA EA ADD 15 MINS: Performed by: INTERNAL MEDICINE

## 2023-02-23 PROCEDURE — 25000003 PHARM REV CODE 250: Performed by: PHYSICIAN ASSISTANT

## 2023-02-23 PROCEDURE — 25000003 PHARM REV CODE 250

## 2023-02-23 PROCEDURE — 63600175 PHARM REV CODE 636 W HCPCS

## 2023-02-23 PROCEDURE — 37000008 HC ANESTHESIA 1ST 15 MINUTES: Performed by: INTERNAL MEDICINE

## 2023-02-23 PROCEDURE — 43235 EGD DIAGNOSTIC BRUSH WASH: CPT | Performed by: INTERNAL MEDICINE

## 2023-02-23 PROCEDURE — 63600175 PHARM REV CODE 636 W HCPCS: Performed by: STUDENT IN AN ORGANIZED HEALTH CARE EDUCATION/TRAINING PROGRAM

## 2023-02-23 PROCEDURE — 25000003 PHARM REV CODE 250: Performed by: INTERNAL MEDICINE

## 2023-02-23 PROCEDURE — 85025 COMPLETE CBC W/AUTO DIFF WBC: CPT | Performed by: PHYSICIAN ASSISTANT

## 2023-02-23 PROCEDURE — 63600175 PHARM REV CODE 636 W HCPCS: Performed by: ANESTHESIOLOGY

## 2023-02-23 PROCEDURE — 85027 COMPLETE CBC AUTOMATED: CPT | Performed by: PHYSICIAN ASSISTANT

## 2023-02-23 PROCEDURE — 25000003 PHARM REV CODE 250: Performed by: STUDENT IN AN ORGANIZED HEALTH CARE EDUCATION/TRAINING PROGRAM

## 2023-02-23 PROCEDURE — 21400001 HC TELEMETRY ROOM

## 2023-02-23 PROCEDURE — 82378 CARCINOEMBRYONIC ANTIGEN: CPT | Performed by: PHYSICIAN ASSISTANT

## 2023-02-23 RX ORDER — SODIUM CHLORIDE, SODIUM GLUCONATE, SODIUM ACETATE, POTASSIUM CHLORIDE AND MAGNESIUM CHLORIDE 30; 37; 368; 526; 502 MG/100ML; MG/100ML; MG/100ML; MG/100ML; MG/100ML
INJECTION, SOLUTION INTRAVENOUS CONTINUOUS
Status: DISCONTINUED | OUTPATIENT
Start: 2023-02-23 | End: 2023-02-24 | Stop reason: HOSPADM

## 2023-02-23 RX ORDER — DEXAMETHASONE SODIUM PHOSPHATE 4 MG/ML
INJECTION, SOLUTION INTRA-ARTICULAR; INTRALESIONAL; INTRAMUSCULAR; INTRAVENOUS; SOFT TISSUE
Status: DISCONTINUED | OUTPATIENT
Start: 2023-02-23 | End: 2023-02-23

## 2023-02-23 RX ORDER — PROPOFOL 10 MG/ML
VIAL (ML) INTRAVENOUS
Status: DISCONTINUED | OUTPATIENT
Start: 2023-02-23 | End: 2023-02-23

## 2023-02-23 RX ORDER — GLYCOPYRROLATE 0.2 MG/ML
INJECTION INTRAMUSCULAR; INTRAVENOUS
Status: DISCONTINUED | OUTPATIENT
Start: 2023-02-23 | End: 2023-02-23

## 2023-02-23 RX ORDER — FENTANYL CITRATE 50 UG/ML
INJECTION, SOLUTION INTRAMUSCULAR; INTRAVENOUS
Status: DISCONTINUED | OUTPATIENT
Start: 2023-02-23 | End: 2023-02-23

## 2023-02-23 RX ORDER — LIDOCAINE HYDROCHLORIDE 20 MG/ML
INJECTION, SOLUTION EPIDURAL; INFILTRATION; INTRACAUDAL; PERINEURAL
Status: DISCONTINUED | OUTPATIENT
Start: 2023-02-23 | End: 2023-02-23

## 2023-02-23 RX ORDER — ONDANSETRON 2 MG/ML
INJECTION INTRAMUSCULAR; INTRAVENOUS
Status: DISCONTINUED | OUTPATIENT
Start: 2023-02-23 | End: 2023-02-23

## 2023-02-23 RX ORDER — INDOMETHACIN 50 MG/1
100 SUPPOSITORY RECTAL
Status: DISCONTINUED | OUTPATIENT
Start: 2023-02-23 | End: 2023-02-24 | Stop reason: HOSPADM

## 2023-02-23 RX ORDER — SODIUM CHLORIDE, SODIUM LACTATE, POTASSIUM CHLORIDE, CALCIUM CHLORIDE 600; 310; 30; 20 MG/100ML; MG/100ML; MG/100ML; MG/100ML
INJECTION, SOLUTION INTRAVENOUS CONTINUOUS
Status: DISCONTINUED | OUTPATIENT
Start: 2023-02-23 | End: 2023-02-24 | Stop reason: HOSPADM

## 2023-02-23 RX ORDER — INDOMETHACIN 50 MG/1
SUPPOSITORY RECTAL
Status: COMPLETED
Start: 2023-02-23 | End: 2023-02-23

## 2023-02-23 RX ORDER — SODIUM CHLORIDE 9 MG/ML
INJECTION, SOLUTION INTRAVENOUS CONTINUOUS
Status: DISCONTINUED | OUTPATIENT
Start: 2023-02-23 | End: 2023-02-24 | Stop reason: HOSPADM

## 2023-02-23 RX ORDER — ROCURONIUM BROMIDE 10 MG/ML
INJECTION, SOLUTION INTRAVENOUS
Status: DISCONTINUED | OUTPATIENT
Start: 2023-02-23 | End: 2023-02-23

## 2023-02-23 RX ORDER — PHENYLEPHRINE HYDROCHLORIDE 10 MG/ML
INJECTION INTRAVENOUS
Status: DISCONTINUED | OUTPATIENT
Start: 2023-02-23 | End: 2023-02-23

## 2023-02-23 RX ORDER — ONDANSETRON 2 MG/ML
4 INJECTION INTRAMUSCULAR; INTRAVENOUS DAILY PRN
Status: CANCELLED | OUTPATIENT
Start: 2023-02-23

## 2023-02-23 RX ADMIN — DILTIAZEM HYDROCHLORIDE 120 MG: 120 CAPSULE, COATED, EXTENDED RELEASE ORAL at 03:02

## 2023-02-23 RX ADMIN — ONDANSETRON 4 MG: 2 INJECTION INTRAMUSCULAR; INTRAVENOUS at 01:02

## 2023-02-23 RX ADMIN — PHENYLEPHRINE HYDROCHLORIDE 100 MCG: 10 INJECTION INTRAVENOUS at 01:02

## 2023-02-23 RX ADMIN — SODIUM CHLORIDE, POTASSIUM CHLORIDE, SODIUM LACTATE AND CALCIUM CHLORIDE: 600; 310; 30; 20 INJECTION, SOLUTION INTRAVENOUS at 01:02

## 2023-02-23 RX ADMIN — HYDROCODONE BITARTRATE AND ACETAMINOPHEN 1 TABLET: 5; 325 TABLET ORAL at 02:02

## 2023-02-23 RX ADMIN — GLYCOPYRROLATE 0.2 MG: 0.2 INJECTION INTRAMUSCULAR; INTRAVENOUS at 01:02

## 2023-02-23 RX ADMIN — LINACLOTIDE 145 MCG: 145 CAPSULE, GELATIN COATED ORAL at 05:02

## 2023-02-23 RX ADMIN — DIPHENHYDRAMINE HYDROCHLORIDE 25 MG: 25 CAPSULE ORAL at 08:02

## 2023-02-23 RX ADMIN — METOPROLOL TARTRATE 25 MG: 25 TABLET, FILM COATED ORAL at 08:02

## 2023-02-23 RX ADMIN — FENTANYL CITRATE 50 MCG: 50 INJECTION, SOLUTION INTRAMUSCULAR; INTRAVENOUS at 01:02

## 2023-02-23 RX ADMIN — HYDROXYZINE PAMOATE 25 MG: 25 CAPSULE ORAL at 02:02

## 2023-02-23 RX ADMIN — SPIRONOLACTONE 100 MG: 25 TABLET ORAL at 03:02

## 2023-02-23 RX ADMIN — LIDOCAINE HYDROCHLORIDE 100 MG: 20 INJECTION, SOLUTION INTRAVENOUS at 01:02

## 2023-02-23 RX ADMIN — CEFEPIME 2 G: 2 INJECTION, POWDER, FOR SOLUTION INTRAVENOUS at 01:02

## 2023-02-23 RX ADMIN — FOLIC ACID 1 MG: 1 TABLET ORAL at 03:02

## 2023-02-23 RX ADMIN — CEFEPIME 2 G: 2 INJECTION, POWDER, FOR SOLUTION INTRAVENOUS at 03:02

## 2023-02-23 RX ADMIN — PANTOPRAZOLE SODIUM 40 MG: 40 TABLET, DELAYED RELEASE ORAL at 03:02

## 2023-02-23 RX ADMIN — HYDROXYZINE PAMOATE 25 MG: 25 CAPSULE ORAL at 03:02

## 2023-02-23 RX ADMIN — SODIUM CHLORIDE: 9 INJECTION, SOLUTION INTRAVENOUS at 05:02

## 2023-02-23 RX ADMIN — HYDROXYZINE PAMOATE 25 MG: 25 CAPSULE ORAL at 11:02

## 2023-02-23 RX ADMIN — ROCURONIUM BROMIDE 50 MG: 10 INJECTION, SOLUTION INTRAVENOUS at 01:02

## 2023-02-23 RX ADMIN — DEXAMETHASONE SODIUM PHOSPHATE 4 MG: 4 INJECTION, SOLUTION INTRA-ARTICULAR; INTRALESIONAL; INTRAMUSCULAR; INTRAVENOUS; SOFT TISSUE at 01:02

## 2023-02-23 RX ADMIN — PROPOFOL 100 MG: 10 INJECTION, EMULSION INTRAVENOUS at 01:02

## 2023-02-23 RX ADMIN — SUGAMMADEX 200 MG: 100 INJECTION, SOLUTION INTRAVENOUS at 01:02

## 2023-02-23 RX ADMIN — HYDROCODONE BITARTRATE AND ACETAMINOPHEN 1 TABLET: 5; 325 TABLET ORAL at 03:02

## 2023-02-23 RX ADMIN — INDOMETHACIN 100 MG: 50 SUPPOSITORY RECTAL at 01:02

## 2023-02-23 RX ADMIN — HYDROCODONE BITARTRATE AND ACETAMINOPHEN 1 TABLET: 5; 325 TABLET ORAL at 08:02

## 2023-02-23 RX ADMIN — METOPROLOL TARTRATE 25 MG: 25 TABLET, FILM COATED ORAL at 09:02

## 2023-02-23 RX ADMIN — SODIUM CHLORIDE, POTASSIUM CHLORIDE, SODIUM LACTATE AND CALCIUM CHLORIDE: 600; 310; 30; 20 INJECTION, SOLUTION INTRAVENOUS at 12:02

## 2023-02-23 NOTE — ANESTHESIA POSTPROCEDURE EVALUATION
Anesthesia Post Evaluation    Patient: Abby Prabhakar    Procedure(s) Performed: Procedure(s) (LRB):  EGD (ESOPHAGOGASTRODUODENOSCOPY) (N/A)  EGD, WITH FOREIGN BODY REMOVAL    Final Anesthesia Type: general      Patient location during evaluation: PACU  Patient participation: Yes- Able to Participate  Level of consciousness: awake  Post-procedure vital signs: reviewed and stable  Pain management: adequate  Airway patency: patent    PONV status at discharge: vomiting (controlled) and nausea (controlled)  Anesthetic complications: no      Cardiovascular status: hemodynamically stable  Respiratory status: spontaneous ventilation and unassisted  Hydration status: euvolemic  Follow-up not needed.  Comments:              Vitals Value Taken Time   /76 02/23/23 1409   Temp 36.2 °C (97.2 °F) 02/23/23 1407   Pulse 78 02/23/23 1409   Resp 15 02/23/23 1409   SpO2 100 % 02/23/23 1409         Event Time   Out of Recovery 02/23/2023 14:12:59         Pain/Hanna Score: Pain Rating Prior to Med Admin: 3 (2/23/2023  3:26 PM)  Pain Rating Post Med Admin: 0 (2/23/2023  3:10 AM)  Hanna Score: 9 (2/23/2023  2:09 PM)

## 2023-02-23 NOTE — PLAN OF CARE
Problem: Adult Inpatient Plan of Care  Goal: Plan of Care Review  Outcome: Ongoing, Progressing  Flowsheets (Taken 2/22/2023 2258)  Plan of Care Reviewed With: patient  Goal: Patient-Specific Goal (Individualized)  Outcome: Ongoing, Progressing  Flowsheets (Taken 2/22/2023 2258)  Individualized Care Needs: offer pain meds as ordered for pain management  Goal: Absence of Hospital-Acquired Illness or Injury  Outcome: Ongoing, Progressing  Intervention: Identify and Manage Fall Risk  Flowsheets (Taken 2/22/2023 2258)  Safety Promotion/Fall Prevention:   assistive device/personal item within reach   nonskid shoes/socks when out of bed   side rails raised x 2  Intervention: Prevent Skin Injury  Flowsheets (Taken 2/22/2023 2258)  Body Position: position changed independently  Skin Protection: transparent dressing maintained  Intervention: Prevent and Manage VTE (Venous Thromboembolism) Risk  Flowsheets (Taken 2/22/2023 2258)  Activity Management:   Ambulated to bathroom - L4   Ambulated in room - L4  VTE Prevention/Management:   ambulation promoted   ROM (active) performed  Range of Motion: ROM (range of motion) performed  Intervention: Prevent Infection  Flowsheets (Taken 2/22/2023 2258)  Infection Prevention: hand hygiene promoted  Goal: Optimal Comfort and Wellbeing  Outcome: Ongoing, Progressing  Intervention: Monitor Pain and Promote Comfort  Flowsheets (Taken 2/22/2023 2258)  Pain Management Interventions:   awakened for pain meds per patient request   quiet environment facilitated  Intervention: Provide Person-Centered Care  Flowsheets (Taken 2/22/2023 2258)  Trust Relationship/Rapport:   care explained   emotional support provided   questions answered  Goal: Readiness for Transition of Care  Outcome: Ongoing, Progressing

## 2023-02-23 NOTE — TRANSFER OF CARE
"Anesthesia Transfer of Care Note    Patient: Abby Prabhakar    Procedure(s) Performed: Procedure(s) (LRB):  EGD (ESOPHAGOGASTRODUODENOSCOPY) (N/A)  EGD, WITH FOREIGN BODY REMOVAL    Patient location: GI    Anesthesia Type: general    Transport from OR: Transported from OR on room air with adequate spontaneous ventilation    Post pain: adequate analgesia    Post assessment: no apparent anesthetic complications    Post vital signs: stable    Level of consciousness: sedated    Nausea/Vomiting: no nausea/vomiting    Complications: none    Transfer of care protocol was followed      Last vitals:   Visit Vitals  /74 (BP Location: Left arm, Patient Position: Lying)   Pulse 78   Temp 36.1 °C (97 °F) (Tympanic)   Resp 14   Ht 5' 2.99" (1.6 m)   Wt 63.5 kg (140 lb)   SpO2 98%   BMI 24.81 kg/m²     "

## 2023-02-23 NOTE — PROGRESS NOTES
[facility]  Hospital Medicine Progress Note        Chief Complaint: Inpatient Follow-up for Jaundice     HPI:   Mrs Prabhakar is a 72 y.o. female with a past medical history of essential hypertension, atrial fibrillation and rheumatoid arthritis presented to [facility] on 2/20/2023 for constipation and nausea. Patient reports symptoms began 4 days ago after bile duct stent placement by Dr. Schrader. Patient reports last bowel movement was 4 days ago. Patient reports no improvement with home medications. Patient also endorses abdominal pain with intermittent shortness of breath. Patient denies fever, chills, vomiting, and chest pain.  Initial vital signs in ED were /72, pulse 99, respirations 20, temperature 36.3° C, and SpO2 99% on room air.  Labs revealed WBC 20.5, RBC 3.56, hemoglobin 11 5, hematocrit 31.7, platelets 530, neutrophils 17.13, Na 131, BUN 30.8, creatinine 0.99, alkaline phosphatase 267, albumin 2.3, bilirubin total 18.4, direct bilirubin 9.8, AST 94, , BNP 70.1, and troponin undetectable. EKG revealed atrial fibrillation with heart rate of 95 bpm.  Chest x-ray revealed trace right pleural effusion. CT Abdomen/Pelvis with Contrast revealed no evidence of bowel obstruction, moderate stool burden, pneumobilia with recent ERCP with placement of biliary and pancreatic stents, distended gallbladder, and mixed density peripancreatic collection at tip of pancreatic stent. Patient had bowel movement in ED after enema. Patient was given LR bolus, IV morphine 4 mg, IV Zofran 4 mg, and IV cefepime 2 g in ED.  GI was consulted.  Patient was admitted to hospital medicine service for further medical management. GI planning for ERCP on 02/22 for removal of stent & evaluation of peripancreatic fluid collection which patient will be kept NPO overnight.  Cardiology consulted for irregular heart rhythm which was thought to be secondary to PACs.  Echocardiogram ordered, will follow up. On  02/21, she was noted to be upset as she did not know what the plan was.  It was explained to her that her previous ERCP biopsy had revealed ductal adenocarcinoma which the patient and her daughter knew from perusing the chart and the patient portal. They were particularly upset regarding the plan for ERCP and what was being done regarding the fluid collection. It was explained to them that she was being covered for an inch infectious process with IV antibiotics and that wilber pancreatic hemorrhage, benign fluid collection, developing abscess were all possible differentials. It was also explained to them that it did not appear from her EKG and telemetry strip that she had atrial fibrillation and that her irregular heart rhythm was likely due to prolonged refractory period following PACs. Also endorsed mild-to-moderate epigastric abdominal pain radiating to right mid back and left shoulder. Oncology consulted, will follow recommendations.    Interval Hx:   Today, Mrs. Prabhakar stated that she was doing well and had no new complaints. Reported improvement in abdominal pain & itching since yesterday. Also reports feeling less fatigued. To be kept NPO for ERCP tomorrow for removal of stent. Will follow-up GI recommendations and procedure report tomorrow.      Objective/physical exam:  General: alert, icteric appearing lady lying comfortably in bed, in no acute distress  HENT: oral and oropharyngeal mucosa moist, pink, with no erythema or exudates, no ear pain or discharge  Neck: normal neck movement, no lymph nodes or swellings, no JVD or Carotid bruit  Respiratory: clear breathing sounds bilaterally, no crackles, rales, ronchi or wheezes  Cardiovascular: clear S1 and S2, no murmurs, rubs or gallops  Peripheral Vascular: no lesions, ulcers or erosions, normal peripheral pulses and no pedal edema  Gastrointestinal: soft, non-distended abdomen with TTP in epigastric region; no guarding, rigidity or rebound tenderness, normal  bowel sounds  Integumentary: normal skin color, no rashes or lesions  Neuro: AAO x 3; motor strength 5/5 in B/L UEs & LEs; sensation intact to gross and fine touch B/L; CN II-XII grossly intact    VITAL SIGNS: 24 HRS MIN & MAX LAST   Temp  Min: 97.4 °F (36.3 °C)  Max: 98.5 °F (36.9 °C) 98.5 °F (36.9 °C)   BP  Min: 114/70  Max: 137/70 124/73   Pulse  Min: 52  Max: 69  64   Resp  Min: 14  Max: 20 18   SpO2  Min: 98 %  Max: 100 % 100 %     Recent Labs   Lab 02/20/23  0629 02/21/23  0313   WBC 20.5* 14.3*   RBC 3.56* 3.25*   HGB 11.5* 10.4*   HCT 31.7* 29.5*   MCV 89.0 90.8   MCH 32.3 32.0   MCHC 36.3* 35.3   RDW 17.6* 17.4*   * 415*   MPV 12.4* 11.3*       Recent Labs   Lab 02/20/23  0751 02/21/23  0317   * 130*   K 3.8 3.9   CO2 23 23   BUN 30.8* 24.2*   CREATININE 0.99 0.80   CALCIUM 8.0* 7.6*   ALBUMIN 2.3* 2.0*   ALKPHOS 267* 220*   * 112*   AST 94* 62*   BILITOT 18.4* 13.5*       Microbiology Results (last 7 days)       Procedure Component Value Units Date/Time    Blood Culture [[id number]]  (Normal) Collected: 02/20/23 1431    Order Status: Completed Specimen: Blood Updated: 02/22/23 1500     CULTURE, BLOOD ([facility]) No Growth At 48 Hours    Blood Culture [[id number]]  (Normal) Collected: 02/20/23 1431    Order Status: Completed Specimen: Blood Updated: 02/22/23 1500     CULTURE, BLOOD ([facility]) No Growth At 48 Hours    Blood Culture [[id number]]     Order Status: Canceled Specimen: Blood from Arm, Left     Blood Culture [[id number]]     Order Status: Canceled Specimen: Blood from Arm, Right            Scheduled Med:   ceFEPime (MAXIPIME) IVPB  2 g Intravenous Q12H    diltiaZEM  120 mg Oral Daily    folic acid  1 mg Oral Daily    [START ON 2/23/2023] linaCLOtide  145 mcg Oral Before breakfast    metoprolol tartrate  25 mg Oral BID    pantoprazole  40 mg Oral Daily    spironolactone  100 mg Oral Daily      Continuous Infusions:   sodium chloride 0.9% 75 mL/hr at 02/22/23 1453      PRN Meds:  acetaminophen,  acetaminophen, dextrose 10%, dextrose 10%, diphenhydrAMINE, glucagon (human recombinant), glucose, glucose, HYDROcodone-acetaminophen, hydrOXYzine pamoate, ondansetron, traMADoL     Assessment/Plan:  Peripancreatic Mixed Density Collection 2/2 possible Pancreatitis vs Abscess vs Hemorrhage  Biliary Obstruction 2/2 Pancreatic Mass s/p Biliary Stent on 2/16/2023  Leukocytosis  Pancreatic Mass (cytology positive for Adenocarcinoma)  Hyperbilirubinemia 2/2 Obstruction vs Recent ERCP  Transaminitis  Thrombocytosis  Irregular Rhythm 2/2 PACs  Constipation   Hyponatremia  Normocytic Anemia  HTN  Rheumatoid Arthritis    Patient continues to be admitted for close monitoring   GI consulted; follow recommendations  GI planning on ERCP tomorrow on 02/23, will follow up on report; patient to be kept NPO overnight  Oncology consulted; following recommendations   Patient's family wants to take patient to [facility] in [facility] for which case management which will be consulted close to discharge  Cardiology on board; follow recommendations  Patient continued on IV cefepime 2 g q.12h  Continued on diltiazem 120 mg, folic acid 1 mg, metoprolol tartrate 25 mg b.i.d., Protonix 40 mg daily, Aldactone 100 mg, Linaclotide 125 mcg  Continue monitoring patient's symptoms   Patient continued on IV NS 75 mL/hour   Echocardiogram ordered; follow-up    VTE prophylaxis: SCDs    Patient condition:  Stable    Anticipated discharge and Disposition:     Pending    All diagnosis and differential diagnosis have been reviewed; assessment and plan has been documented; I have personally reviewed the labs and test results that are presently available; I have reviewed the patients medication list; I have reviewed the consulting providers response and recommendations. I have reviewed or attempted to review medical records based upon their availability    All of the patient's questions have been  addressed and answered. Patient's is agreeable to  the above stated plan. I will continue to monitor closely and make adjustments to medical management as needed.  _____________________________________________________________________    Nutrition Status:  NPO    Radiology:  Echo Saline Bubble? No  · The left ventricle is normal in size with normal systolic function. LVEF   55-60%.  · LV global longitudinal strain measures -17.9%.  · Normal left ventricular diastolic function.  · Normal right ventricular size with normal right ventricular systolic   function.  · Mild mitral regurgitation.  · Mild to moderate tricuspid regurgitation.  · Mild pulmonic regurgitation.  · The estimated PA systolic pressure is 22 mmHg.         Chidi Reynolds MD   02/22/2023

## 2023-02-23 NOTE — PROGRESS NOTES
[facility]  Hospital Medicine Progress Note        Chief Complaint: Inpatient Follow-up for Jaundice     HPI:   Mrs Prabhakar is a 72 y.o. female with a past medical history of essential hypertension, atrial fibrillation and rheumatoid arthritis presented to [facility] on 2/20/2023 for constipation and nausea. Patient reports symptoms began 4 days ago after bile duct stent placement by Dr. Schrader. Patient reports last bowel movement was 4 days ago. Patient reports no improvement with home medications. Patient also endorses abdominal pain with intermittent shortness of breath. Patient denies fever, chills, vomiting, and chest pain.  Initial vital signs in ED were /72, pulse 99, respirations 20, temperature 36.3° C, and SpO2 99% on room air.  Labs revealed WBC 20.5, RBC 3.56, hemoglobin 11 5, hematocrit 31.7, platelets 530, neutrophils 17.13, Na 131, BUN 30.8, creatinine 0.99, alkaline phosphatase 267, albumin 2.3, bilirubin total 18.4, direct bilirubin 9.8, AST 94, , BNP 70.1, and troponin undetectable. EKG revealed atrial fibrillation with heart rate of 95 bpm.  Chest x-ray revealed trace right pleural effusion. CT Abdomen/Pelvis with Contrast revealed no evidence of bowel obstruction, moderate stool burden, pneumobilia with recent ERCP with placement of biliary and pancreatic stents, distended gallbladder, and mixed density peripancreatic collection at tip of pancreatic stent. Patient had bowel movement in ED after enema. Patient was given LR bolus, IV morphine 4 mg, IV Zofran 4 mg, and IV cefepime 2 g in ED.  GI was consulted.  Patient was admitted to hospital medicine service for further medical management. GI planning for ERCP on 02/22 for removal of stent & evaluation of peripancreatic fluid collection which patient will be kept NPO overnight.  Cardiology consulted for irregular heart rhythm which was thought to be secondary to PACs.  Echocardiogram ordered, will follow up. On  02/21, she was noted to be upset as she did not know what the plan was.  It was explained to her that her previous ERCP biopsy had revealed ductal adenocarcinoma which the patient and her daughter knew from perusing the chart and the patient portal. They were particularly upset regarding the plan for ERCP and what was being done regarding the fluid collection. It was explained to them that she was being covered for an inch infectious process with IV antibiotics and that iwlber pancreatic hemorrhage, benign fluid collection, developing abscess were all possible differentials. It was also explained to them that it did not appear from her EKG and telemetry strip that she had atrial fibrillation and that her irregular heart rhythm was likely due to prolonged refractory period following PACs. Also endorsed mild-to-moderate epigastric abdominal pain radiating to right mid back and left shoulder. Oncology consulted, will follow recommendations.    Interval Hx:   Patient seen and examined this morning with family member bedside plan for ERCP today no other issues reported     Objective/physical exam:  General: alert, icteric appearing lady lying comfortably in bed, in no acute distress  HENT: oral and oropharyngeal mucosa moist,   Respiratory: clear breathing sounds bilaterally,   Cardiovascular: clear S1 and S2, no murmurs, rubs or gallops  Peripheral Vascular: no lesions, ulcers or erosions, normal peripheral pulses and no pedal edema  Gastrointestinal: soft,  Integumentary: normal skin color, no rashes or lesions  Neuro: AAO x 3;   VITAL SIGNS: 24 HRS MIN & MAX LAST   Temp  Min: 97.3 °F (36.3 °C)  Max: 98.5 °F (36.9 °C) 97.3 °F (36.3 °C)   BP  Min: 114/70  Max: 145/82 138/72   Pulse  Min: 51  Max: 64  (!) 51   Resp  Min: 16  Max: 20 16   SpO2  Min: 99 %  Max: 100 % 100 %     Recent Labs   Lab 02/20/23  0629 02/21/23  0313 02/23/23  0327   WBC 20.5* 14.3* 12.9*   RBC 3.56* 3.25* 3.16*   HGB 11.5* 10.4* 10.1*   HCT 31.7*  29.5* 29.6*   MCV 89.0 90.8 93.7   MCH 32.3 32.0 32.0   MCHC 36.3* 35.3 34.1   RDW 17.6* 17.4* 15.8   * 415* 478*   MPV 12.4* 11.3* 10.7*     Recent Labs   Lab 02/20/23  0751 02/21/23  0317 02/23/23  0327   * 130* 128*   K 3.8 3.9 4.3   CO2 23 23 18*   BUN 30.8* 24.2* 18.0   CREATININE 0.99 0.80 0.74   CALCIUM 8.0* 7.6* 7.5*   ALBUMIN 2.3* 2.0* 2.0*   ALKPHOS 267* 220* 193*   * 112* 88*   AST 94* 62* 51*   BILITOT 18.4* 13.5* 9.1*     Microbiology Results (last 7 days)       Procedure Component Value Units Date/Time    Blood Culture [[id number]]  (Normal) Collected: 02/20/23 1431    Order Status: Completed Specimen: Blood Updated: 02/22/23 1500     CULTURE, BLOOD ([facility]) No Growth At 48 Hours    Blood Culture [[id number]]  (Normal) Collected: 02/20/23 1431    Order Status: Completed Specimen: Blood Updated: 02/22/23 1500     CULTURE, BLOOD ([facility]) No Growth At 48 Hours    Blood Culture [[id number]]     Order Status: Canceled Specimen: Blood from Arm, Left     Blood Culture [[id number]]     Order Status: Canceled Specimen: Blood from Arm, Right            Scheduled Med:   ceFEPime (MAXIPIME) IVPB  2 g Intravenous Q12H    diltiaZEM  120 mg Oral Daily    folic acid  1 mg Oral Daily    linaCLOtide  145 mcg Oral Before breakfast    metoprolol tartrate  25 mg Oral BID    pantoprazole  40 mg Oral Daily    spironolactone  100 mg Oral Daily      Continuous Infusions:   sodium chloride 0.9% 75 mL/hr at 02/23/23 0555      PRN Meds:  acetaminophen, acetaminophen, dextrose 10%, dextrose 10%, diphenhydrAMINE, glucagon (human recombinant), glucose, glucose, HYDROcodone-acetaminophen, hydrOXYzine pamoate, ondansetron, traMADoL     Assessment/Plan:  Pancreatic cancer with cytology positive for adenocarcinoma  Biliary Obstruction 2/2 Pancreatic Mass s/p Biliary Stent on 2/16/2023  Leukocytosis trending down  Pancreatic Mass (cytology positive for Adenocarcinoma)  Hyperbilirubinemia 2/2 Obstruction vs Recent  ERCP  Transaminitis  Hyponatremia  Thrombocytosis  Irregular Rhythm 2/2 PACs  Constipation   Hyponatremia  Normocytic Anemia  HTN  Rheumatoid Arthritis      Plan for ERCP today to remove the PD stent and place metal stent  Patient to follow-up with Dr. Napoles on March 1st for EUS  Continue with Relistor and Linzess has been started  Patient continues to be admitted for close monitoring   Oncology consulted; following recommendations   Patient's family wants to take patient to [facility] in [facility] for which case management which will be consulted close to discharge  Cardiology on board; follow recommendations  Patient continued on IV cefepime 2 g q.12h patient had leukocytosis on admission blood cultures have been negative, UA negative Chest x-ray negative, CT of the abdomen showed moderate amount of stool burden.  White cell count has been trending down  Continued on diltiazem 120 mg, folic acid 1 mg, metoprolol tartrate 25 mg b.i.d., Protonix 40 mg daily, Aldactone 100 mg, Linaclotide 125 mcg  Continue monitoring patient's symptoms   Patient continued on IV NS 75 mL/hour   Echocardiogram showed EF of 55-60%  Repeat blood work in a.m.      VTE prophylaxis: SCDs    Patient condition:  Stable    Anticipated discharge and Disposition:     Pending    All diagnosis and differential diagnosis have been reviewed; assessment and plan has been documented; I have personally reviewed the labs and test results that are presently available; I have reviewed the patients medication list; I have reviewed the consulting providers response and recommendations. I have reviewed or attempted to review medical records based upon their availability    All of the patient's questions have been  addressed and answered. Patient's is agreeable to the above stated plan. I will continue to monitor closely and make adjustments to medical management as  needed.  _____________________________________________________________________    Nutrition Status:  NPO    Radiology:  Echo Saline Bubble? No  · The left ventricle is normal in size with normal systolic function. LVEF   55-60%.  · LV global longitudinal strain measures -17.9%.  · Normal left ventricular diastolic function.  · Normal right ventricular size with normal right ventricular systolic   function.  · Mild mitral regurgitation.  · Mild to moderate tricuspid regurgitation.  · Mild pulmonic regurgitation.  · The estimated PA systolic pressure is 22 mmHg.         Yamilka Salazar MD   02/23/2023

## 2023-02-23 NOTE — PLAN OF CARE
Called and started referral to [facility] per pt. request. Information given but appointment cannot be scheduled until after pt is discharged.  On dc call [phone], option 1 and inform of pt dc. Then fax referral to [phone]   [facility] MRN will be [id number]

## 2023-02-23 NOTE — ANESTHESIA PROCEDURE NOTES
Intubation    Date/Time: 2/23/2023 1:19 PM  Performed by: Nicolle Barrientos  Authorized by: Lopez Sosa MD     Intubation:     Induction:  Intravenous    Intubated:  Postinduction    Mask Ventilation:  Easy mask    Attempts:  1    Attempted By:  Student    Method of Intubation:  Direct    Blade:  Dawkins 2    Laryngeal View Grade: Grade I - full view of cords      Difficult Airway Encountered?: No      Complications:  None    Airway Device:  Oral endotracheal tube    Airway Device Size:  7.0    Style/Cuff Inflation:  Cuffed    Inflation Amount (mL):  8    Tube secured:  21    Secured at:  The lips    Placement Verified By:  Capnometry    Complicating Factors:  None    Findings Post-Intubation:  BS equal bilateral

## 2023-02-23 NOTE — PROVATION PATIENT INSTRUCTIONS
Discharge Summary/Instructions after an Endoscopic Procedure  Patient Name: Abby Prabhakar  Patient MRN: [id number]  Patient YOB: 1950  Thursday, February 23, 2023  Santhosh Dupree MD  Dear patient,  As a result of recent federal legislation (The Federal Cures Act), you may   receive lab or pathology results from your procedure in your [facility]   account before your physician is able to contact you. Your physician or   their representative will relay the results to you with their   recommendations at their soonest availability.  Thank you,  RESTRICTIONS:  During your procedure today, you received medications for sedation.  These   medications may affect your judgment, balance and coordination.  Therefore,   for 24 hours, you have the following restrictions:   - DO NOT drive a car, operate machinery, make legal/financial decisions,   sign important papers or drink alcohol.    ACTIVITY:  Today: no heavy lifting, straining or running due to procedural   sedation/anesthesia.  The following day: return to full activity including work.  DIET:  Eat and drink normally unless instructed otherwise.     TREATMENT FOR COMMON SIDE EFFECTS:  - Mild abdominal pain, nausea, belching, bloating or excessive gas:  rest,   eat lightly and use a heating pad.  - Sore Throat: treat with throat lozenges and/or gargle with warm salt   water.  - Because air was used during the procedure, expelling large amounts of air   from your rectum or belching is normal.  - If a bowel prep was taken, you may not have a bowel movement for 1-3 days.    This is normal.  SYMPTOMS TO WATCH FOR AND REPORT TO YOUR PHYSICIAN:  1. Abdominal pain or bloating, other than gas cramps.  2. Chest pain.  3. Back pain.  4. Signs of infection such as: chills or fever occurring within 24 hours   after the procedure.  5. Rectal bleeding, which would show as bright red, maroon, or black stools.   (A tablespoon of blood from the rectum is not serious,  especially if   hemorrhoids are present.)  6. Vomiting.  7. Weakness or dizziness.  GO DIRECTLY TO THE NEAREST EMERGENCY ROOM IF YOU HAVE ANY OF THE FOLLOWING:      Difficulty breathing              Chills and/or fever over 101 F   Persistent vomiting and/or vomiting blood   Severe abdominal pain   Severe chest pain   Black, tarry stools   Bleeding- more than one tablespoon   Any other symptom or condition that you feel may need urgent attention  Your doctor recommends these additional instructions:  If any biopsies were taken, your doctors clinic will contact you in 1 to 2   weeks with any results.  - [facility] to [facility] for [facility].   - Resume regular diet.  - dc iv abx  - start levaquin today  - if afebril in am then ok for dc to home in am on levaquin for 10 days  - followup with eus next week as scheduled   For questions, problems or results please call your physician - Santhosh Dupree MD at Work:  [phone].  [facility], EMERGENCY ROOM PHONE NUMBER: [phone]  IF A COMPLICATION OR EMERGENCY SITUATION ARISES AND YOU ARE UNABLE TO REACH   YOUR PHYSICIAN - GO DIRECTLY TO THE EMERGENCY ROOM.  MD Santhosh Kelley MD  2/23/2023 2:06:05 PM  This report has been verified and signed electronically.  Dear patient,  As a result of recent federal legislation (The Federal Cures Act), you may   receive lab or pathology results from your procedure in your [facility]   account before your physician is able to contact you. Your physician or   their representative will relay the results to you with their   recommendations at their soonest availability.  Thank you,  PROVATION

## 2023-02-23 NOTE — ANESTHESIA PREPROCEDURE EVALUATION
"                                                                                                             2023  Abby Prabhakar is a 72 y.o., female   Pre-operative evaluation for Procedure(s) (LRB):  ERCP (N/A)    /78   Pulse 66   Temp 36.6 °C (97.8 °F) (Oral)   Resp 18   Ht 5' 2.99" (1.6 m)   Wt 63.5 kg (140 lb)   SpO2 100%   BMI 24.81 kg/m²     Past Medical History:   Diagnosis Date    Hypertension     Rheumatoid arthritis, unspecified        Patient Active Problem List   Diagnosis    Pancreatic cancer       Review of patient's allergies indicates:   Allergen Reactions    Atorvastatin     Penicillins        Current Outpatient Medications   Medication Instructions    ALPRAZolam (XANAX) 0.5 mg, Oral, 3 times daily    colestipoL (COLESTID) 5 g, Oral, 2 times daily    diltiaZEM (DILACOR XR) 120 mg, Oral, Daily    folic acid (FOLVITE) 1 mg, Oral, Daily    furosemide (LASIX) 40 mg, Oral, 2 times daily    meloxicam (MOBIC) 7.5 mg, Oral, Daily    mupirocin (BACTROBAN) 2 % ointment Topical (Top), 3 times daily    pantoprazole (PROTONIX) 40 mg, Oral, Daily    spironolactone (ALDACTONE) 100 mg, Oral, Daily       Past Surgical History:   Procedure Laterality Date     SECTION      ERCP N/A 2023    Procedure: ERCP;  Surgeon: Barbra Schrader III, MD;  Location: [facility] ENDOSCOPY;  Service: Gastroenterology;  Laterality: N/A;    ERCP W/ SPHICTEROTOMY  2023    Procedure: ERCP, WITH SPHINCTEROTOMY;  Surgeon: Barbra Schrader III, MD;  Location: [facility] ENDOSCOPY;  Service: Gastroenterology;;    ERCP, WITH STENT INSERTION  2023    Procedure: ERCP, WITH STENT INSERTION;  Surgeon: Babrra Schrader III, MD;  Location: [facility] ENDOSCOPY;  Service: Gastroenterology;;    HYSTERECTOMY           Lab Results   Component Value Date    WBC 12.9 (H) 2023    HGB 10.1 (L) 2023    HCT 29.6 (L) 2023    MCV 93.7 2023     (H) 2023          BMP  Lab " Results   Component Value Date     (L) 02/23/2023    K 4.3 02/23/2023    CHLORIDE 105 02/23/2023    CO2 18 (L) 02/23/2023    GLUCOSE 109 02/23/2023    BUN 18.0 02/23/2023    CREATININE 0.74 02/23/2023    CALCIUM 7.5 (L) 02/23/2023    EGFRNONAA >60 01/17/2019        INR  Recent Labs     02/22/23  0327   INR 1.08   PROTIME 13.9           Diagnostic Studies:      EKG:  Results for orders placed or performed during the hospital encounter of 02/20/23   EKG 12-lead    Collection Time: 02/20/23  1:30 PM    Narrative    Test Reason : R06.02,    Vent. Rate : 095 BPM     Atrial Rate : 000 BPM     P-R Int : 000 ms          QRS Dur : 082 ms      QT Int : 366 ms       P-R-T Axes : 000 019 069 degrees     QTc Int : 459 ms    Normal sinus rhythm  Atrial premature complexes  Abnormal ECG  No previous ECGs available  Confirmed by Nico Newman MD ([id number]) on 2/20/2023 11:04:19 PM    Referred By: [email or web address]   SELF           Confirmed By:Nico Newman MD       Results for orders placed during the hospital encounter of 02/20/23    Echo Saline Bubble? No    Interpretation Summary  · The left ventricle is normal in size with normal systolic function. LVEF 55-60%.  · LV global longitudinal strain measures -17.9%.  · Normal left ventricular diastolic function.  · Normal right ventricular size with normal right ventricular systolic function.  · Mild mitral regurgitation.  · Mild to moderate tricuspid regurgitation.  · Mild pulmonic regurgitation.  · The estimated PA systolic pressure is 22 mmHg.        .      Pre-op Assessment    I have reviewed the Patient Summary Reports.    I have reviewed the NPO Status.   I have reviewed the Medications.     Review of Systems  Anesthesia Hx:  No problems with previous Anesthesia  Denies Family Hx of Anesthesia complications.   Denies Personal Hx of Anesthesia complications.   Cardiovascular:  Cardiovascular Normal  No Cardiac Complaints   Pulmonary:  Pulmonary Normal No Pulmonary Complaints    Hepatic/GI:   No Current GERD Sx       Physical Exam  General: Alert and Oriented    Airway:  Mallampati: II   Mouth Opening: Normal  TM Distance: Normal  Tongue: Normal  Neck ROM: Normal ROM    Dental:  Intact    Chest/Lungs:  Clear to auscultation, Normal Respiratory Rate    Heart:  Rate: Normal  Rhythm: Irregularly Irregular        Anesthesia Plan  Type of Anesthesia, risks & benefits discussed:    Anesthesia Type: Gen ETT  Intra-op Monitoring Plan: Standard ASA Monitors  Post Op Pain Control Plan: multimodal analgesia  Induction:  IV and Inhalation  Airway Plan: Direct, Post-Induction  Informed Consent: Informed consent signed with the Patient and all parties understand the risks and agree with anesthesia plan.  All questions answered.   ASA Score: 3  Day of Surgery Review of History & Physical: H&P Update referred to the surgeon/provider.  Anesthesia Plan Notes: Discussed Anesthetic Plan w/ Pt/Family. Questions Entertained. Accepted.    Ready For Surgery From Anesthesia Perspective.     .

## 2023-02-24 ENCOUNTER — TELEPHONE (OUTPATIENT)
Dept: HEMATOLOGY/ONCOLOGY | Facility: CLINIC | Age: 73
End: 2023-02-24
Payer: MEDICARE

## 2023-02-24 VITALS
TEMPERATURE: 97 F | DIASTOLIC BLOOD PRESSURE: 75 MMHG | OXYGEN SATURATION: 100 % | BODY MASS INDEX: 24.8 KG/M2 | RESPIRATION RATE: 20 BRPM | HEIGHT: 63 IN | SYSTOLIC BLOOD PRESSURE: 130 MMHG | HEART RATE: 53 BPM | WEIGHT: 140 LBS

## 2023-02-24 DIAGNOSIS — C25.9 MALIGNANT NEOPLASM OF PANCREAS, UNSPECIFIED LOCATION OF MALIGNANCY: Primary | ICD-10-CM

## 2023-02-24 LAB
ALBUMIN SERPL-MCNC: 2.2 G/DL (ref 3.4–4.8)
ALBUMIN/GLOB SERPL: 0.8 RATIO (ref 1.1–2)
ALP SERPL-CCNC: 189 UNIT/L (ref 40–150)
ALT SERPL-CCNC: 92 UNIT/L (ref 0–55)
AST SERPL-CCNC: 64 UNIT/L (ref 5–34)
BASOPHILS # BLD AUTO: 0.04 X10(3)/MCL (ref 0–0.2)
BASOPHILS NFR BLD AUTO: 0.2 %
BILIRUBIN DIRECT+TOT PNL SERPL-MCNC: 8.8 MG/DL
BUN SERPL-MCNC: 13.7 MG/DL (ref 9.8–20.1)
CALCIUM SERPL-MCNC: 8.2 MG/DL (ref 8.4–10.2)
CHLORIDE SERPL-SCNC: 104 MMOL/L (ref 98–107)
CO2 SERPL-SCNC: 17 MMOL/L (ref 23–31)
CREAT SERPL-MCNC: 0.71 MG/DL (ref 0.55–1.02)
EOSINOPHIL # BLD AUTO: 0 X10(3)/MCL (ref 0–0.9)
EOSINOPHIL NFR BLD AUTO: 0 %
ERYTHROCYTE [DISTWIDTH] IN BLOOD BY AUTOMATED COUNT: 15.1 % (ref 11.5–17)
GFR SERPLBLD CREATININE-BSD FMLA CKD-EPI: >60 MLS/MIN/1.73/M2
GLOBULIN SER-MCNC: 2.9 GM/DL (ref 2.4–3.5)
GLUCOSE SERPL-MCNC: 146 MG/DL (ref 82–115)
HCT VFR BLD AUTO: 30.9 % (ref 37–47)
HGB BLD-MCNC: 10.5 G/DL (ref 12–16)
IMM GRANULOCYTES # BLD AUTO: 0.7 X10(3)/MCL (ref 0–0.04)
IMM GRANULOCYTES NFR BLD AUTO: 3.8 %
LYMPHOCYTES # BLD AUTO: 1.22 X10(3)/MCL (ref 0.6–4.6)
LYMPHOCYTES NFR BLD AUTO: 6.6 %
MCH RBC QN AUTO: 31.8 PG
MCHC RBC AUTO-ENTMCNC: 34 G/DL (ref 33–36)
MCV RBC AUTO: 93.6 FL (ref 80–94)
MONOCYTES # BLD AUTO: 0.63 X10(3)/MCL (ref 0.1–1.3)
MONOCYTES NFR BLD AUTO: 3.4 %
NEUTROPHILS # BLD AUTO: 15.97 X10(3)/MCL (ref 2.1–9.2)
NEUTROPHILS NFR BLD AUTO: 86 %
NRBC BLD AUTO-RTO: 0 %
PLATELET # BLD AUTO: 534 X10(3)/MCL (ref 130–400)
PMV BLD AUTO: 10.6 FL (ref 7.4–10.4)
POTASSIUM SERPL-SCNC: 4.7 MMOL/L (ref 3.5–5.1)
PROT SERPL-MCNC: 5.1 GM/DL (ref 5.8–7.6)
RBC # BLD AUTO: 3.3 X10(6)/MCL (ref 4.2–5.4)
SODIUM SERPL-SCNC: 128 MMOL/L (ref 136–145)
WBC # SPEC AUTO: 18.6 X10(3)/MCL (ref 4.5–11.5)

## 2023-02-24 PROCEDURE — 25000003 PHARM REV CODE 250: Performed by: STUDENT IN AN ORGANIZED HEALTH CARE EDUCATION/TRAINING PROGRAM

## 2023-02-24 PROCEDURE — 85025 COMPLETE CBC W/AUTO DIFF WBC: CPT | Performed by: INTERNAL MEDICINE

## 2023-02-24 PROCEDURE — 25000003 PHARM REV CODE 250: Performed by: NURSE PRACTITIONER

## 2023-02-24 PROCEDURE — 25000003 PHARM REV CODE 250: Performed by: PHYSICIAN ASSISTANT

## 2023-02-24 PROCEDURE — 80053 COMPREHEN METABOLIC PANEL: CPT | Performed by: INTERNAL MEDICINE

## 2023-02-24 PROCEDURE — 25000003 PHARM REV CODE 250: Performed by: INTERNAL MEDICINE

## 2023-02-24 PROCEDURE — 63600175 PHARM REV CODE 636 W HCPCS: Performed by: PHYSICIAN ASSISTANT

## 2023-02-24 PROCEDURE — 63600175 PHARM REV CODE 636 W HCPCS: Performed by: STUDENT IN AN ORGANIZED HEALTH CARE EDUCATION/TRAINING PROGRAM

## 2023-02-24 RX ORDER — SODIUM CHLORIDE 1 G/1
1000 TABLET ORAL 2 TIMES DAILY
Status: DISCONTINUED | OUTPATIENT
Start: 2023-02-24 | End: 2023-02-24 | Stop reason: HOSPADM

## 2023-02-24 RX ADMIN — SODIUM CHLORIDE: 9 INJECTION, SOLUTION INTRAVENOUS at 04:02

## 2023-02-24 RX ADMIN — PANTOPRAZOLE SODIUM 40 MG: 40 TABLET, DELAYED RELEASE ORAL at 08:02

## 2023-02-24 RX ADMIN — HYDROCODONE BITARTRATE AND ACETAMINOPHEN 1 TABLET: 5; 325 TABLET ORAL at 01:02

## 2023-02-24 RX ADMIN — HYDROCODONE BITARTRATE AND ACETAMINOPHEN 1 TABLET: 5; 325 TABLET ORAL at 11:02

## 2023-02-24 RX ADMIN — METHYLNALTREXONE BROMIDE 12 MG: 12 INJECTION, SOLUTION SUBCUTANEOUS at 02:02

## 2023-02-24 RX ADMIN — LINACLOTIDE 145 MCG: 145 CAPSULE, GELATIN COATED ORAL at 06:02

## 2023-02-24 RX ADMIN — DILTIAZEM HYDROCHLORIDE 120 MG: 120 CAPSULE, COATED, EXTENDED RELEASE ORAL at 08:02

## 2023-02-24 RX ADMIN — METOPROLOL TARTRATE 25 MG: 25 TABLET, FILM COATED ORAL at 08:02

## 2023-02-24 RX ADMIN — SPIRONOLACTONE 100 MG: 25 TABLET ORAL at 08:02

## 2023-02-24 RX ADMIN — FOLIC ACID 1 MG: 1 TABLET ORAL at 08:02

## 2023-02-24 RX ADMIN — HYDROCODONE BITARTRATE AND ACETAMINOPHEN 1 TABLET: 5; 325 TABLET ORAL at 06:02

## 2023-02-24 RX ADMIN — SODIUM CHLORIDE 1000 MG: 1 TABLET ORAL at 08:02

## 2023-02-24 RX ADMIN — LEVOFLOXACIN 750 MG: 500 TABLET, FILM COATED ORAL at 02:02

## 2023-02-24 RX ADMIN — DIPHENHYDRAMINE HYDROCHLORIDE 25 MG: 25 CAPSULE ORAL at 04:02

## 2023-02-24 RX ADMIN — CEFEPIME 2 G: 2 INJECTION, POWDER, FOR SOLUTION INTRAVENOUS at 01:02

## 2023-02-24 RX ADMIN — HYDROXYZINE PAMOATE 25 MG: 25 CAPSULE ORAL at 11:02

## 2023-02-24 NOTE — PROGRESS NOTES
[facility]  Hospital Medicine Progress Note        Chief Complaint: Inpatient Follow-up for Jaundice     HPI:   Mrs Prabhakar is a 72 y.o. female with a past medical history of essential hypertension, atrial fibrillation and rheumatoid arthritis presented to [facility] on 2/20/2023 for constipation and nausea. Patient reports symptoms began 4 days ago after bile duct stent placement by Dr. Schrader. Patient reports last bowel movement was 4 days ago. Patient reports no improvement with home medications. Patient also endorses abdominal pain with intermittent shortness of breath. Patient denies fever, chills, vomiting, and chest pain.  Initial vital signs in ED were /72, pulse 99, respirations 20, temperature 36.3° C, and SpO2 99% on room air.  Labs revealed WBC 20.5, RBC 3.56, hemoglobin 11 5, hematocrit 31.7, platelets 530, neutrophils 17.13, Na 131, BUN 30.8, creatinine 0.99, alkaline phosphatase 267, albumin 2.3, bilirubin total 18.4, direct bilirubin 9.8, AST 94, , BNP 70.1, and troponin undetectable. EKG revealed atrial fibrillation with heart rate of 95 bpm.  Chest x-ray revealed trace right pleural effusion. CT Abdomen/Pelvis with Contrast revealed no evidence of bowel obstruction, moderate stool burden, pneumobilia with recent ERCP with placement of biliary and pancreatic stents, distended gallbladder, and mixed density peripancreatic collection at tip of pancreatic stent. Patient had bowel movement in ED after enema. Patient was given LR bolus, IV morphine 4 mg, IV Zofran 4 mg, and IV cefepime 2 g in ED.  GI was consulted.  Patient was admitted to hospital medicine service for further medical management. GI planning for ERCP on 02/22 for removal of stent & evaluation of peripancreatic fluid collection which patient will be kept NPO overnight.  Cardiology consulted for irregular heart rhythm which was thought to be secondary to PACs.  Echocardiogram ordered, will follow up. On  02/21, she was noted to be upset as she did not know what the plan was.  It was explained to her that her previous ERCP biopsy had revealed ductal adenocarcinoma which the patient and her daughter knew from perusing the chart and the patient portal. They were particularly upset regarding the plan for ERCP and what was being done regarding the fluid collection. It was explained to them that she was being covered for an inch infectious process with IV antibiotics and that wilber pancreatic hemorrhage, benign fluid collection, developing abscess were all possible differentials. It was also explained to them that it did not appear from her EKG and telemetry strip that she had atrial fibrillation and that her irregular heart rhythm was likely due to prolonged refractory period following PACs. Also endorsed mild-to-moderate epigastric abdominal pain radiating to right mid back and left shoulder. Oncology consulted going had repeat ERCP with pancreatic stent removal in CBD stent was left in place    Interval Hx:   Patient seen and examined this morning really wanted to go home discussed the lab work including sodium and white cell count    Objective/physical exam:  General: alert, icteric appearing lady lying comfortably in bed, in no acute distress  HENT: oral and oropharyngeal mucosa moist,   Respiratory: clear breathing sounds bilaterally,   Cardiovascular: clear S1 and S2, no murmurs, rubs or gallops  Peripheral Vascular: no lesions, ulcers or erosions, normal peripheral pulses and no pedal edema  Gastrointestinal: soft,  Integumentary: normal skin color, no rashes or lesions  Neuro: AAO x 3;   VITAL SIGNS: 24 HRS MIN & MAX LAST   Temp  Min: 97.2 °F (36.2 °C)  Max: 98.3 °F (36.8 °C) 97.3 °F (36.3 °C)   BP  Min: 95/59  Max: 146/85 130/75   Pulse  Min: 53  Max: 78  (!) 53   Resp  Min: 12  Max: 20 20   SpO2  Min: 98 %  Max: 100 % 100 %     Recent Labs   Lab 02/21/23  0313 02/23/23  0327 02/24/23  0609   WBC 14.3* 12.9*  18.6*   RBC 3.25* 3.16* 3.30*   HGB 10.4* 10.1* 10.5*   HCT 29.5* 29.6* 30.9*   MCV 90.8 93.7 93.6   MCH 32.0 32.0 31.8   MCHC 35.3 34.1 34.0   RDW 17.4* 15.8 15.1   * 478* 534*   MPV 11.3* 10.7* 10.6*     Recent Labs   Lab 02/21/23  0317 02/23/23  0327 02/24/23  0609   * 128* 128*   K 3.9 4.3 4.7   CO2 23 18* 17*   BUN 24.2* 18.0 13.7   CREATININE 0.80 0.74 0.71   CALCIUM 7.6* 7.5* 8.2*   ALBUMIN 2.0* 2.0* 2.2*   ALKPHOS 220* 193* 189*   * 88* 92*   AST 62* 51* 64*   BILITOT 13.5* 9.1* 8.8*     Microbiology Results (last 7 days)       Procedure Component Value Units Date/Time    Blood Culture [[id number]]  (Normal) Collected: 02/20/23 1431    Order Status: Completed Specimen: Blood Updated: 02/23/23 1500     CULTURE, BLOOD ([facility]) No Growth At 72 Hours    Blood Culture [[id number]]  (Normal) Collected: 02/20/23 1431    Order Status: Completed Specimen: Blood Updated: 02/23/23 1500     CULTURE, BLOOD ([facility]) No Growth At 72 Hours    Blood Culture [[id number]]     Order Status: Canceled Specimen: Blood from Arm, Left     Blood Culture [[id number]]     Order Status: Canceled Specimen: Blood from Arm, Right            Scheduled Med:   diltiaZEM  120 mg Oral Daily    folic acid  1 mg Oral Daily    levoFLOXacin  750 mg Oral Daily    [START ON 2/25/2023] linaCLOtide  290 mcg Oral Before breakfast    methylnaltrexone  12 mg Subcutaneous Once    metoprolol tartrate  25 mg Oral BID    pantoprazole  40 mg Oral Daily    sodium chloride  1,000 mg Oral BID    spironolactone  100 mg Oral Daily      Continuous Infusions:   sodium chloride 0.9%      electrolyte-A      lactated ringers 10 mL/hr at 02/23/23 1242      PRN Meds:  acetaminophen, acetaminophen, dextrose 10%, dextrose 10%, diphenhydrAMINE, glucagon (human recombinant), glucose, glucose, HYDROcodone-acetaminophen, hydrOXYzine pamoate, indomethacin, ondansetron, traMADoL     Assessment/Plan:  Pancreatic cancer with cytology positive for adenocarcinoma  Biliary  Obstruction 2/2 Pancreatic Mass s/p Biliary Stent on 2/16/2023  Leukocytosis   Pancreatic Mass (cytology positive for Adenocarcinoma)  Hyperbilirubinemia 2/2 Obstruction vs Recent ERCP  Transaminitis  Hyponatremia  Thrombocytosis  Irregular Rhythm 2/2 PACs  Constipation   Hyponatremia  Normocytic Anemia  HTN  Rheumatoid Arthritis    White cell count trending up but patient has been afebrile will keep a close watch for now   Started on Levaquin postprocedure  If patient spikes fever again then we will repeat the fever workup   Sodium is 128 have ordered urine and plasma osmolality status post PD stent removal on March 23rd   Patient really wanted to go home but discussed the above findings with the patient and she has agreed to stay   Patient was supposed to follow-up with Dr. Napoles but apparently they want to take her to [place] and want to follow-up at [facility]  Continue with Relistor and Linzess has been started   Cardiology on board; follow recommendations  Admit  blood cultures have been negative, UA negative Chest x-ray negative, CT of the abdomen showed moderate amount of stool burden.    Continued on diltiazem 120 mg, folic acid 1 mg, metoprolol tartrate 25 mg b.i.d., Protonix 40 mg daily, Aldactone 100 mg, Linaclotide 125 mcg  Continue monitoring patient's symptoms   Echocardiogram showed EF of 55-60%  Repeat blood work in a.m.      VTE prophylaxis: SCDs    Patient condition:  Stable    Anticipated discharge and Disposition:     Pending    All diagnosis and differential diagnosis have been reviewed; assessment and plan has been documented; I have personally reviewed the labs and test results that are presently available; I have reviewed the patients medication list; I have reviewed the consulting providers response and recommendations. I have reviewed or attempted to review medical records based upon their availability    All of the patient's questions have been  addressed and answered. Patient's is  agreeable to the above stated plan. I will continue to monitor closely and make adjustments to medical management as needed.  _____________________________________________________________________    Nutrition Status:  NPO    Radiology:  [place] ERCP Biliary And Pancreatic By Rad Tech  Narrative: EXAMINATION:  [place] ERCP BILIARY AND PANCREATIC    CLINICAL HISTORY:  pancreatic mass;    FINDINGS:  Fluoroscopic assistance provided during ERCP.  Images were independently interpreted by the attending physician in the fluoroscopy suite.    Fluoro time 12 seconds.    Reference Air Kerma: 5.52 mGy.  Impression: Fluoroscopic assistance provided as above.    Electronically signed by: Memo Rodriguez  Date:    02/23/2023  Time:    14:31      Yamilka Salazar MD   02/24/2023          Update : at 3.00 pm          Called by the nursing staff that patient's sister wanted to talk to me.  I went in the room with nursing staff MsRemington Any I tried to explain the current situation that patient's white cell count went up to 18,000 from 12,000(yest) and we want to make sure that it is trending down so I recommended patient staying in the hospital for 24 hours and make sure patient is not spiking fever for 24 hours and if white cell count is trending down and patient remains fever free for 24 hours then I will discharge her home 1st thing in the morning  Also discussed that her sodium is 128 for which I have ordered urine and plasma osmolality and we have to wait for those results and want to make sure that sodium is trending up before we discharge her.  Family and patient was very upset that I am keeping the patient against her wishes I did tell her very respectfully that I can not keep the patient in the hospital but I am just giving my medical advice patient used curse words as she told me she wants to go home.  They told me that other physicians have cleared her as their is nothing from their  standpoint  I did tell them that as per their point of  view they have cleared but because the white cell count is still trending up and sodium is still trending down so I can not medically discharge her until they are trending down but at the same time I can not keep her in the hospital against her wishes   But I did tell them that if blood work is improving and if she remains fever free by tomorrow I will discharge her 1st thing in the morning this was relayed to them twice once in the room and again patient's son came and talked to me and I did tell him the same thing at the nurse's station   Patient's sister was very upset with me and told me that I am not listening to her  Patients sister told me to get out of the room so I excused myself.  I called nursing supervisor to come by talk to the patient .  Patient's sister threatened me that they will report me to state board.  I again respectfully told them that I am just trying to do my job.    Patient wants to leave AMA.  Nursing staff told them that if usually the leave AMA then insurance might not cover for the hospital stay they did not say anything they just asked me to get out of the home

## 2023-02-24 NOTE — TELEPHONE ENCOUNTER
----- Message from Doron May MD sent at 2/24/2023 10:35 AM CST -----  Regarding: RE: pt medical p.o.a wants results  I tried   to call the patient at the following number, I got no answer.    I explained to the patient herself the follow-up.    The patient understands that she is going to see Dr. Yosi Napoles, and that we have we will tentatively set her up a follow-up appointment with me.  If the family wants her to move to [place] permanently ,they can certainly take that decision and move her.  The patient did want to make sure she had an appointment here in case something happened.      ----- Message -----  From: Cass Nick  Sent: 2/24/2023  10:28 AM CST  To: Doron May MD  Subject: pt medical p.o.a wants results                   I called to let pt know about f.u visit next week and per Mrs pako morris she would like for you to call her at [phone] before she brings her to this f.u. she states she would like the results from yesterdays testing and she has questions.

## 2023-02-24 NOTE — NURSING
Patient and sister remain very anxious regarding patients current state of health and stated that they wanted to take her to [place] for further gi/cancer treatments there and wanted her to be d/c'd. Explained to patient and sisters x2 that dr wanted to be sure that she was stable before d/c'ing her because her sodium level was decreasing at 128 and her wbc was rising. Verbalized understanding.

## 2023-02-24 NOTE — PROGRESS NOTES
"Gastroenterology Progress Note    Subjective:   Epigastric and back pain is controlled with norco.  Feels bloated.  Only passing small amounts of stool.  Tolerating regular diet.  Walking the halls. Pruritis that resolves with Benadryl. Urine is getting lighter.     WBCs increasing, however remains afebrile and LFTs unchanged. Na low 128, unchanged from yesterday. Started on sodium chloride tablets per primary.     Wants to go home.  Sister plans to move her to [place] to be with her and will take her to [facility].  Also has made appt with GI in [facility] next week.  Wants to cancel EUS with Dr. Napoles.      Objective:  Vital Signs:  /75   Pulse (!) 53   Temp 97.3 °F (36.3 °C) (Oral)   Resp 20   Ht 5' 2.99" (1.6 m)   Wt 63.5 kg (140 lb)   SpO2 100%   BMI 24.81 kg/m²   Body mass index is 24.81 kg/m².    Physical Exam:  Physical Exam  Constitutional:       General: She is not in acute distress.  HENT:      Head: Normocephalic and atraumatic.   Eyes:      General: Scleral icterus present.   Cardiovascular:      Rate and Rhythm: Normal rate.   Pulmonary:      Effort: Pulmonary effort is normal. No respiratory distress.   Abdominal:      General: Bowel sounds are normal. There is no distension.      Palpations: Abdomen is soft.      Tenderness: There is no abdominal tenderness. There is no guarding or rebound.   Musculoskeletal:         General: Normal range of motion.      Right lower leg: No edema.      Left lower leg: No edema.   Skin:     General: Skin is warm and dry.      Coloration: Skin is jaundiced.   Neurological:      Mental Status: She is alert and oriented to person, place, and time.       Labs:  Recent Results (from the past 48 hour(s))   Comprehensive Metabolic Panel    Collection Time: 02/23/23  3:27 AM   Result Value Ref Range    Sodium Level 128 (L) 136 - 145 mmol/L    Potassium Level 4.3 3.5 - 5.1 mmol/L    Chloride 105 98 - 107 mmol/L    Carbon Dioxide 18 (L) 23 - 31 mmol/L    Glucose Level 109 82 " - 115 mg/dL    Blood Urea Nitrogen 18.0 9.8 - 20.1 mg/dL    Creatinine 0.74 0.55 - 1.02 mg/dL    Calcium Level Total 7.5 (L) 8.4 - 10.2 mg/dL    Protein Total 4.4 (L) 5.8 - 7.6 gm/dL    Albumin Level 2.0 (L) 3.4 - 4.8 g/dL    Globulin 2.4 2.4 - 3.5 gm/dL    Albumin/Globulin Ratio 0.8 (L) 1.1 - 2.0 ratio    Bilirubin Total 9.1 (H) <=1.5 mg/dL    Alkaline Phosphatase 193 (H) 40 - 150 unit/L    Alanine Aminotransferase 88 (H) 0 - 55 unit/L    Aspartate Aminotransferase 51 (H) 5 - 34 unit/L    eGFR >60 mls/min/1.73/m2   CEA (in-house)    Collection Time: 02/23/23  3:27 AM   Result Value Ref Range    Carcinoembryonic Antigen 11.82 (H) 0.00 - 3.00 ng/mL   Cancer Antigen 19-9    Collection Time: 02/23/23  3:27 AM   Result Value Ref Range    CA 19-9 2,235.70 (H) 0.00 - 37.00 unit/mL   CBC with Differential    Collection Time: 02/23/23  3:27 AM   Result Value Ref Range    WBC 12.9 (H) 4.5 - 11.5 x10(3)/mcL    RBC 3.16 (L) 4.20 - 5.40 x10(6)/mcL    Hgb 10.1 (L) 12.0 - 16.0 g/dL    Hct 29.6 (L) 37.0 - 47.0 %    MCV 93.7 80.0 - 94.0 fL    MCH 32.0 pg    MCHC 34.1 33.0 - 36.0 g/dL    RDW 15.8 11.5 - 17.0 %    Platelet 478 (H) 130 - 400 x10(3)/mcL    MPV 10.7 (H) 7.4 - 10.4 fL    IG# 0.93 (H) 0 - 0.04 x10(3)/mcL    IG% 7.2 %    NRBC% 0.0 %   Manual Differential    Collection Time: 02/23/23  3:27 AM   Result Value Ref Range    Neut Man 82 %    Lymph Man 7 %    Monocyte Man 10 %    Myelo Man 2 %    Instr WBC 12.9 x10(3)/mcL    Abs Mono 1.29 0.1 - 1.3 x10(3)/mcL    Abs Lymp 0.903 0.6 - 4.6 x10(3)/mcL    Abs Neut 10.836 (H) 2.1 - 9.2 x10(3)/mcL    RBC Morph Abnormal (A) Normal    Target Cell 1+ (A) (none)    Platelet Est Increased (A) Normal, Adequate   Comprehensive Metabolic Panel    Collection Time: 02/24/23  6:09 AM   Result Value Ref Range    Sodium Level 128 (L) 136 - 145 mmol/L    Potassium Level 4.7 3.5 - 5.1 mmol/L    Chloride 104 98 - 107 mmol/L    Carbon Dioxide 17 (L) 23 - 31 mmol/L    Glucose Level 146 (H) 82 - 115 mg/dL     Blood Urea Nitrogen 13.7 9.8 - 20.1 mg/dL    Creatinine 0.71 0.55 - 1.02 mg/dL    Calcium Level Total 8.2 (L) 8.4 - 10.2 mg/dL    Protein Total 5.1 (L) 5.8 - 7.6 gm/dL    Albumin Level 2.2 (L) 3.4 - 4.8 g/dL    Globulin 2.9 2.4 - 3.5 gm/dL    Albumin/Globulin Ratio 0.8 (L) 1.1 - 2.0 ratio    Bilirubin Total 8.8 (H) <=1.5 mg/dL    Alkaline Phosphatase 189 (H) 40 - 150 unit/L    Alanine Aminotransferase 92 (H) 0 - 55 unit/L    Aspartate Aminotransferase 64 (H) 5 - 34 unit/L    eGFR >60 mls/min/1.73/m2   CBC with Differential    Collection Time: 02/24/23  6:09 AM   Result Value Ref Range    WBC 18.6 (H) 4.5 - 11.5 x10(3)/mcL    RBC 3.30 (L) 4.20 - 5.40 x10(6)/mcL    Hgb 10.5 (L) 12.0 - 16.0 g/dL    Hct 30.9 (L) 37.0 - 47.0 %    MCV 93.6 80.0 - 94.0 fL    MCH 31.8 pg    MCHC 34.0 33.0 - 36.0 g/dL    RDW 15.1 11.5 - 17.0 %    Platelet 534 (H) 130 - 400 x10(3)/mcL    MPV 10.6 (H) 7.4 - 10.4 fL    Neut % 86.0 %    Lymph % 6.6 %    Mono % 3.4 %    Eos % 0.0 %    Basophil % 0.2 %    Lymph # 1.22 0.6 - 4.6 x10(3)/mcL    Neut # 15.97 (H) 2.1 - 9.2 x10(3)/mcL    Mono # 0.63 0.1 - 1.3 x10(3)/mcL    Eos # 0.00 0 - 0.9 x10(3)/mcL    Baso # 0.04 0 - 0.2 x10(3)/mcL    IG# 0.70 (H) 0 - 0.04 x10(3)/mcL    IG% 3.8 %    NRBC% 0.0 %       Imaging:    No new radiological images to review.      Assessment/Plan:  1. Right upper quadrant abdominal pain    2. Jaundice    3. Elevated bilirubin    4. SOB (shortness of breath)    5. Pancreatic mass    6. Atrial fibrillation, unspecified type    7. EKG abnormality       71 yo F known to Dr. Jak Gonzalez.  Patient with a PMH of HTN and RA previously on methotrexate and Plaquenil.  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  ERCP 2/16/23 with placement of plastic stents in CBD and PD.  CBD brushings: ductal adenocarcinoma.  Here with abdominal pain.  Found to have new leukocytosis, improving LFTs, and CT with fluid collection about  "the pancreas.      Adenocarcinoma of the pancreas vs cholangiocarcinoma  Obstructive jaundice  CT abdomen/pelvis with IV contrast noted distended gallbladder, sludge, CBD stent in place, trace pneumobilia, no intrahepatic biliary ductal dilation, pancreas atrophy, questionable subtle hypodense lesion at the region of the ampulla the pancreatic head measuring 1.8 cm, stent in the region of the uncinate process extending beyond the expected contour of the pancreas, mixed density collection in the region of the tip of the stent which extends to encase the SMA in about the celiac measuring 4.5 cm, no pancreatic ductal dilation, moderate stool burden, no bowel obstruction.    Lipase normal.  S/p attempted ERCP 2/23/23: Initially, the ercp scope was advanced through the duodenal bulb.There was evidence of acute angulation at the d1/2 segment and we could not advance up to the level of the stents. therefore we switched over to the straight view. Two previously placed plastic stents were seen in the second portion of the duodenum. removal of pancreatic stent. cbd stent left in place.   - Diet as tolerated   - benadryl/vistaril prn pruritis.   - extensive amount of time spent with patient and friend, explaining anatomy and findings on imaging/endoscopy with diagrams. They were very appreciative.   - Monitor wbc and lft trend. Continue levquin. No plan for intervention at this time. Scheduled for EUS with Dr. Napoles on 3/1 and Likely place metal stent at that time as well.  Family wishes to cancel appt and f/u with GI in [place].  Discussed importance of not delaying procedure and making sure the appt is with a GI that does EUS.  Sister states she has been in the medical field for 30 years and she can "get sh** done and get it done fast"    3. Constipation   - Give another dose of relistor.  Increase Linzess to 290 mcg daily.      GI available if needed.     Elsie Quick PA-C    "

## 2023-02-24 NOTE — NURSING
Once again went in to room and patient and sister was extremely agitated r/t her not being discharged. Once again explained that her labs were not in line with being discharged from dr's standpoint and also explained that we had begun the sodium tablets this am and were planning to recheck labs in am and that she could possibly be discharged tomorrow. Also explained that DR Napoles was planning to perform an EUS on Monday as an outpatient. Sister then replied that no one was doing anything else here and that they were taking her to [place] and that they already had her set up to see GI dr in [place] and that they needed to be discharged and that they wanted to speak to dr Salazar. Notified dr Salazar

## 2023-02-24 NOTE — CARE UPDATE
Call with question  Noted low na  For EUS and block on 3/1  outpatient    Can f/u with me if not going to [facility]   Week of 3/6  with he, CMP, CA 19 9, CEA    Will then plan PET imaging,     Now new recs      Doron May MD

## 2023-02-24 NOTE — NURSING
Went in to patients room to check on patient and was immediately met with frustration from patient and sister regarding GI and not being able to have stents x 2 removed. Notifed gi

## 2023-02-24 NOTE — NURSING
"Patient and family expressed concerns to primary nurse Elsie Romero LPN. Patient and family requesting patient get discharged today. I, charge nurse and hospitalist Yamilka Salazar MD entered patient's room on behalf of these concerns. MD Salazar explained to patient and family that being discharged today is not in the patient's best interest due to abnormal lab work. Patient and family became very upset because GI and oncology told them the patient is cleared to go. MD Salazar explained to them even though they cleared her to go from their standpoint, due to the lab work patient is not medically cleared to leave. The patient's sister said if Salazar will not discharge her today then they will leave. MD Salazra and I explained to patient and family the risks of leaving against medical advice. Patient's sister became very upset and began to threaten MD Salazar that she will report her to the state board. Patient's sister yelled to MD Salazar to "get the hell out the room.. I don't want you in here!" MD Salazar did not say anything and left the room. I told the sister that yelling is not necessary before leaving the room. Nursing supervisor and director were called to speak to family and patient. Director Luis Hernandez and I entered the room to address concerns again. Patient's sister said MD Salazar was "unprofessional". Patient said she's bernardo I didn't "strangled her". Ultimately patient and family decided to leave AMA despite explaining the risks once again. Primary nurse Elsie and I gave patient AMA papers to sign.   "

## 2023-02-25 LAB
BACTERIA BLD CULT: NORMAL
BACTERIA BLD CULT: NORMAL

## 2023-04-09 NOTE — DISCHARGE SUMMARY
[facility] Hospital Medicine Discharge Summary    Admit Date: 2/20/2023  Discharge Date and Time:  February 24, 2023   Admitting Physician:  Team  Discharging Physician: Yamilka Salazar MD.  Primary Care Physician: Juan Manuel Payne MD  Consults:     Discharge Diagnoses:  Please see today's note for hospital stay physical exam discharge diagnosis this patient left against medical advice    Hospital Course:   Please see today's note for hospital stay physical exam discharge diagnosis this patient left against medical advice    Pt was seen and examined on the day of discharge  Vitals:  VITAL SIGNS: 24 HRS MIN & MAX LAST   No data recorded 97.3 °F (36.3 °C)   No data recorded 130/75   No data recorded  (!) 53     No data recorded 20   No data recorded 100 %       Physical Exam:  Please see today's note for hospital stay physical exam discharge diagnosis this patient left against medical advice      Procedures Performed: No admission procedures for hospital encounter.     Significant Diagnostic Studies: See Full reports for all details    No results for input(s): WBC, RBC, HGB, HCT, MCV, MCH, MCHC, RDW, PLT, MPV, GRAN, LYMPH, MONO, BASO, NRBC in the last 168 hours.    No results for input(s): NA, K, CL, CO2, ANIONGAP, BUN, CREATININE, GLU, CALCIUM, PH, MG, ALBUMIN, PROT, ALKPHOS, ALT, AST, BILITOT in the last 168 hours.     Microbiology Results (last 7 days)       Procedure Component Value Units Date/Time    Blood Culture [[id number]]     Order Status: Canceled Specimen: Blood from Arm, Left     Blood Culture [[id number]]     Order Status: Canceled Specimen: Blood from Arm, Right              FL ERCP Biliary And Pancreatic By Rad Tech  Narrative: EXAMINATION:  FL ERCP BILIARY AND PANCREATIC    CLINICAL HISTORY:  pancreatic mass;    FINDINGS:  Fluoroscopic assistance provided during ERCP.  Images were independently interpreted by the attending physician in the fluoroscopy suite.    Fluoro  time 12 seconds.    Reference Air Kerma: 5.52 mGy.  Impression: Fluoroscopic assistance provided as above.    Electronically signed by: Memo Rodriguez  Date:    02/23/2023  Time:    14:31         Medication List        ASK your doctor about these medications      ALPRAZolam 0.5 MG tablet  Commonly known as: XANAX     colestipoL 5 gram granules  Commonly known as: COLESTID     diltiaZEM 120 MG Cdcr  Commonly known as: DILACOR XR     folic acid 1 MG tablet  Commonly known as: FOLVITE     furosemide 40 MG tablet  Commonly known as: LASIX     meloxicam 7.5 MG tablet  Commonly known as: MOBIC     mupirocin 2 % ointment  Commonly known as: BACTROBAN  Apply topically 3 (three) times daily.     pantoprazole 40 MG tablet  Commonly known as: PROTONIX     spironolactone 100 MG tablet  Commonly known as: ALDACTONE               Explained in detail to the patient about the discharge plan, medications, and follow-up visits. Pt understands and agrees with the treatment plan  Discharge Disposition: Left Against Medical Advice   Discharged Condition: stable  Diet-    Medications Per DC med rec  Activities as tolerated    For further questions contact hospitalist office    Discharge time 33 minutes    For worsening symptoms, chest pain, shortness of breath, increased abdominal pain, high grade fever, stroke or stroke like symptoms, immediately go to the nearest Emergency Room or call 911 as soon as possible.      Yamilka Stevens M.D, on 4/9/2023. at 2:42 PM.

## (undated) DEVICE — BLOCK BLOX BITE DENT RIM 54FR

## (undated) DEVICE — GUIDEWIRE VISIGLIDE 025

## (undated) DEVICE — TIP SUCTION YANKAUER

## (undated) DEVICE — DEVICE LOCKING RX - OLYMPUS

## (undated) DEVICE — TUBING O2 FEMALE CONN 13FT

## (undated) DEVICE — KIT SURGICAL COLON .25 1.1OZ

## (undated) DEVICE — SPHINCTEROTOME 44 4.4FRX10MM

## (undated) DEVICE — SUPPORT ULNA NERVE PROTECTOR

## (undated) DEVICE — SOL IRRI STRL WATER 1000ML

## (undated) DEVICE — ELECTRODE REM PLYHSV RETURN 9

## (undated) DEVICE — Device

## (undated) DEVICE — BRUSH CYTOLOGY RX

## (undated) DEVICE — KIT CANIST SUCTION 1200CC

## (undated) DEVICE — COLLECTION SPECIMEN NEPTUNE